# Patient Record
Sex: FEMALE | Race: WHITE | Employment: PART TIME | URBAN - METROPOLITAN AREA
[De-identification: names, ages, dates, MRNs, and addresses within clinical notes are randomized per-mention and may not be internally consistent; named-entity substitution may affect disease eponyms.]

---

## 2018-06-12 ENCOUNTER — HOSPITAL ENCOUNTER (EMERGENCY)
Age: 43
Discharge: HOME | End: 2018-06-12
Attending: EMERGENCY MEDICINE
Payer: COMMERCIAL

## 2018-06-12 VITALS
BODY MASS INDEX: 25.61 KG/M2 | TEMPERATURE: 98.1 F | WEIGHT: 150 LBS | HEIGHT: 64 IN | SYSTOLIC BLOOD PRESSURE: 114 MMHG | RESPIRATION RATE: 18 BRPM | HEART RATE: 67 BPM | DIASTOLIC BLOOD PRESSURE: 75 MMHG | OXYGEN SATURATION: 98 %

## 2018-06-12 DIAGNOSIS — I45.10 INCOMPLETE RIGHT BUNDLE BRANCH BLOCK (RBBB) DETERMINED BY ELECTROCARDIOGRAPHY: ICD-10-CM

## 2018-06-12 DIAGNOSIS — R10.13 ABDOMINAL PAIN, EPIGASTRIC: Primary | ICD-10-CM

## 2018-06-12 DIAGNOSIS — R11.0 NAUSEA WITHOUT VOMITING: ICD-10-CM

## 2018-06-12 DIAGNOSIS — R82.71 ASYMPTOMATIC BACTERIURIA: ICD-10-CM

## 2018-06-12 LAB
ALBUMIN SERPL-MCNC: 3.9 G/DL (ref 3.4–5)
ALBUMIN/GLOB SERPL: 1.2 {RATIO} (ref 0.8–1.7)
ALP SERPL-CCNC: 49 U/L (ref 45–117)
ALT SERPL-CCNC: 24 U/L (ref 13–56)
ANION GAP SERPL CALC-SCNC: 5 MMOL/L (ref 3–18)
APPEARANCE UR: CLEAR
AST SERPL-CCNC: 17 U/L (ref 15–37)
ATRIAL RATE: 56 BPM
BACTERIA URNS QL MICRO: ABNORMAL /HPF
BASOPHILS # BLD: 0 K/UL (ref 0–0.06)
BASOPHILS NFR BLD: 0 % (ref 0–2)
BILIRUB DIRECT SERPL-MCNC: 0.1 MG/DL (ref 0–0.2)
BILIRUB SERPL-MCNC: 0.3 MG/DL (ref 0.2–1)
BILIRUB UR QL: NEGATIVE
BUN SERPL-MCNC: 19 MG/DL (ref 7–18)
BUN/CREAT SERPL: 24 (ref 12–20)
CALCIUM SERPL-MCNC: 9.1 MG/DL (ref 8.5–10.1)
CALCULATED P AXIS, ECG09: 27 DEGREES
CALCULATED R AXIS, ECG10: 60 DEGREES
CALCULATED T AXIS, ECG11: 50 DEGREES
CHLORIDE SERPL-SCNC: 102 MMOL/L (ref 100–108)
CK MB CFR SERPL CALC: NORMAL % (ref 0–4)
CK MB SERPL-MCNC: <1 NG/ML (ref 5–25)
CK SERPL-CCNC: 58 U/L (ref 26–192)
CO2 SERPL-SCNC: 31 MMOL/L (ref 21–32)
COLOR UR: YELLOW
CREAT SERPL-MCNC: 0.8 MG/DL (ref 0.6–1.3)
DIAGNOSIS, 93000: NORMAL
DIFFERENTIAL METHOD BLD: ABNORMAL
EOSINOPHIL # BLD: 0.1 K/UL (ref 0–0.4)
EOSINOPHIL NFR BLD: 1 % (ref 0–5)
EPITH CASTS URNS QL MICRO: ABNORMAL /LPF (ref 0–5)
ERYTHROCYTE [DISTWIDTH] IN BLOOD BY AUTOMATED COUNT: 13.1 % (ref 11.6–14.5)
GLOBULIN SER CALC-MCNC: 3.2 G/DL (ref 2–4)
GLUCOSE SERPL-MCNC: 91 MG/DL (ref 74–99)
GLUCOSE UR STRIP.AUTO-MCNC: NEGATIVE MG/DL
HCG UR QL: NEGATIVE
HCT VFR BLD AUTO: 39.1 % (ref 35–45)
HGB BLD-MCNC: 12.9 G/DL (ref 12–16)
HGB UR QL STRIP: NEGATIVE
KETONES UR QL STRIP.AUTO: ABNORMAL MG/DL
LEUKOCYTE ESTERASE UR QL STRIP.AUTO: ABNORMAL
LIPASE SERPL-CCNC: 146 U/L (ref 73–393)
LYMPHOCYTES # BLD: 2.6 K/UL (ref 0.9–3.6)
LYMPHOCYTES NFR BLD: 31 % (ref 21–52)
MCH RBC QN AUTO: 30.8 PG (ref 24–34)
MCHC RBC AUTO-ENTMCNC: 33 G/DL (ref 31–37)
MCV RBC AUTO: 93.3 FL (ref 74–97)
MONOCYTES # BLD: 0.7 K/UL (ref 0.05–1.2)
MONOCYTES NFR BLD: 9 % (ref 3–10)
NEUTS SEG # BLD: 4.8 K/UL (ref 1.8–8)
NEUTS SEG NFR BLD: 59 % (ref 40–73)
NITRITE UR QL STRIP.AUTO: NEGATIVE
P-R INTERVAL, ECG05: 100 MS
PH UR STRIP: 5.5 [PH] (ref 5–8)
PLATELET # BLD AUTO: 229 K/UL (ref 135–420)
PMV BLD AUTO: 11.2 FL (ref 9.2–11.8)
POTASSIUM SERPL-SCNC: 4.2 MMOL/L (ref 3.5–5.5)
PROT SERPL-MCNC: 7.1 G/DL (ref 6.4–8.2)
PROT UR STRIP-MCNC: NEGATIVE MG/DL
Q-T INTERVAL, ECG07: 452 MS
QRS DURATION, ECG06: 98 MS
QTC CALCULATION (BEZET), ECG08: 436 MS
RBC # BLD AUTO: 4.19 M/UL (ref 4.2–5.3)
RBC #/AREA URNS HPF: ABNORMAL /HPF (ref 0–5)
SODIUM SERPL-SCNC: 138 MMOL/L (ref 136–145)
SP GR UR REFRACTOMETRY: 1.02 (ref 1–1.03)
TROPONIN I SERPL-MCNC: <0.02 NG/ML (ref 0–0.06)
UROBILINOGEN UR QL STRIP.AUTO: 1 EU/DL (ref 0.2–1)
VENTRICULAR RATE, ECG03: 56 BPM
WBC # BLD AUTO: 8.3 K/UL (ref 4.6–13.2)
WBC URNS QL MICRO: ABNORMAL /HPF (ref 0–4)

## 2018-06-12 PROCEDURE — 81001 URINALYSIS AUTO W/SCOPE: CPT | Performed by: EMERGENCY MEDICINE

## 2018-06-12 PROCEDURE — 93005 ELECTROCARDIOGRAM TRACING: CPT

## 2018-06-12 PROCEDURE — 83690 ASSAY OF LIPASE: CPT | Performed by: PHYSICIAN ASSISTANT

## 2018-06-12 PROCEDURE — 85025 COMPLETE CBC W/AUTO DIFF WBC: CPT | Performed by: EMERGENCY MEDICINE

## 2018-06-12 PROCEDURE — 80076 HEPATIC FUNCTION PANEL: CPT | Performed by: PHYSICIAN ASSISTANT

## 2018-06-12 PROCEDURE — 74011250637 HC RX REV CODE- 250/637

## 2018-06-12 PROCEDURE — 82550 ASSAY OF CK (CPK): CPT | Performed by: EMERGENCY MEDICINE

## 2018-06-12 PROCEDURE — 96375 TX/PRO/DX INJ NEW DRUG ADDON: CPT

## 2018-06-12 PROCEDURE — 81025 URINE PREGNANCY TEST: CPT | Performed by: EMERGENCY MEDICINE

## 2018-06-12 PROCEDURE — 80048 BASIC METABOLIC PNL TOTAL CA: CPT | Performed by: EMERGENCY MEDICINE

## 2018-06-12 PROCEDURE — 96374 THER/PROPH/DIAG INJ IV PUSH: CPT

## 2018-06-12 PROCEDURE — 74011250636 HC RX REV CODE- 250/636

## 2018-06-12 PROCEDURE — 74011250636 HC RX REV CODE- 250/636: Performed by: PHYSICIAN ASSISTANT

## 2018-06-12 PROCEDURE — 96361 HYDRATE IV INFUSION ADD-ON: CPT

## 2018-06-12 PROCEDURE — 99284 EMERGENCY DEPT VISIT MOD MDM: CPT

## 2018-06-12 RX ORDER — ONDANSETRON 4 MG/1
TABLET, ORALLY DISINTEGRATING ORAL
Qty: 10 TAB | Refills: 0 | Status: SHIPPED | OUTPATIENT
Start: 2018-06-12 | End: 2019-09-24 | Stop reason: ALTCHOICE

## 2018-06-12 RX ORDER — KETOROLAC TROMETHAMINE 30 MG/ML
INJECTION, SOLUTION INTRAMUSCULAR; INTRAVENOUS
Status: DISCONTINUED
Start: 2018-06-12 | End: 2018-06-12 | Stop reason: HOSPADM

## 2018-06-12 RX ORDER — FAMOTIDINE 20 MG/1
20 TABLET, FILM COATED ORAL 2 TIMES DAILY
Qty: 10 TAB | Refills: 0 | Status: SHIPPED | OUTPATIENT
Start: 2018-06-12 | End: 2018-06-17

## 2018-06-12 RX ORDER — ONDANSETRON 2 MG/ML
4 INJECTION INTRAMUSCULAR; INTRAVENOUS
Status: COMPLETED | OUTPATIENT
Start: 2018-06-12 | End: 2018-06-12

## 2018-06-12 RX ORDER — ONDANSETRON 2 MG/ML
INJECTION INTRAMUSCULAR; INTRAVENOUS
Status: COMPLETED
Start: 2018-06-12 | End: 2018-06-12

## 2018-06-12 RX ORDER — KETOROLAC TROMETHAMINE 30 MG/ML
15 INJECTION, SOLUTION INTRAMUSCULAR; INTRAVENOUS
Status: COMPLETED | OUTPATIENT
Start: 2018-06-12 | End: 2018-06-12

## 2018-06-12 RX ADMIN — SODIUM CHLORIDE 1000 ML: 900 INJECTION, SOLUTION INTRAVENOUS at 17:52

## 2018-06-12 RX ADMIN — ALUMINUM HYDROXIDE AND MAGNESIUM HYDROXIDE 30 ML: 200; 200 SUSPENSION ORAL at 17:53

## 2018-06-12 RX ADMIN — ONDANSETRON 4 MG: 2 INJECTION INTRAMUSCULAR; INTRAVENOUS at 17:53

## 2018-06-12 RX ADMIN — KETOROLAC TROMETHAMINE 15 MG: 30 INJECTION INTRAMUSCULAR; INTRAVENOUS at 17:57

## 2018-06-12 NOTE — ED NOTES
Hollie Ballesteros is a 39 y.o. female that was discharged in stable. Pt was accompanied by spouse. Pt is not driving. The patients diagnosis, condition and treatment were explained to  patient and aftercare instructions were given. The patient verbalized understanding. Patient armband removed and shredded.

## 2018-06-12 NOTE — ED TRIAGE NOTES
Pt reports several days of progressively worsening intermittent epigastric upper abdominal pain with nausea, fatigue, hot flashes, body aches, and stomach upset.

## 2018-06-12 NOTE — DISCHARGE INSTRUCTIONS

## 2018-06-12 NOTE — ED PROVIDER NOTES
EMERGENCY DEPARTMENT HISTORY AND PHYSICAL EXAM    Date: 6/12/2018  Patient Name: Manuel Nelson    History of Presenting Illness     Chief Complaint   Patient presents with    Epigastric Pain    Nausea    Fatigue         History Provided By: Patient    Chief Complaint: Abdominal pain  Duration: 2 Days  Timing:  Constant and Waxing and Waning  Location: Epigastrium   Quality: Dull  Severity: 7 out of 10  Modifying Factors: None identified. Associated Symptoms: hot flashes with pain, nausea. Additional History (Context):     Manuel Nelson is a 39 y.o. female with no significant PMHX who presents to the emergency department C/O epigastric pain that waxes and wanes and is dull in nature. Associated sxs include hot flashes and nausea when the pain peaks. 3 episodes of loose stool yesterday, none today. Decreased PO intake today. Pt denies an association of pain with PO intake, vomiting, fevers, urinary sx, vaginal bleeding, and any other sxs or complaints. Pt reports she is under increased stress lately and wonders if this could be contributing. Last week felt achy, had productive cough and chest pain associated with cough. She states those symptoms have all improved. PCP: Silvia George MD    Current Facility-Administered Medications   Medication Dose Route Frequency Provider Last Rate Last Dose    sodium chloride 0.9 % bolus infusion 1,000 mL  1,000 mL IntraVENous ONCE Bhavna Eugene PA-C 1,000 mL/hr at 06/12/18 1752 1,000 mL at 06/12/18 1752    ketorolac (TORADOL) 30 mg/mL (1 mL) injection              Current Outpatient Prescriptions   Medication Sig Dispense Refill    ondansetron (ZOFRAN ODT) 4 mg disintegrating tablet Take 1-2 tablets every 6-8 hours as needed for nausea and vomiting. 10 Tab 0    famotidine (PEPCID) 20 mg tablet Take 1 Tab by mouth two (2) times a day for 5 days. 10 Tab 0       Past History     Past Medical History:  History reviewed.  No pertinent past medical history. Past Surgical History:  History reviewed. No pertinent surgical history. Family History:  Family History   Problem Relation Age of Onset    Cancer Father        Social History:  Social History   Substance Use Topics    Smoking status: None    Smokeless tobacco: None    Alcohol use None       Allergies:  No Known Allergies      Review of Systems   Review of Systems   Constitutional: Positive for diaphoresis. Negative for fever. Respiratory: Positive for cough (resolved). Cardiovascular: Positive for chest pain (resolved). Gastrointestinal: Positive for abdominal pain and nausea. Negative for constipation and vomiting. Genitourinary: Negative for dysuria and vaginal bleeding. All other systems reviewed and are negative. Physical Exam     Vitals:    06/12/18 1612   BP: 114/75   Pulse: 67   Resp: 18   Temp: 98.1 °F (36.7 °C)   SpO2: 98%   Weight: 68 kg (150 lb)   Height: 5' 4\" (1.626 m)     Physical Exam   Constitutional: She appears well-developed and well-nourished. No distress. HENT:   Head: Normocephalic and atraumatic. Right Ear: External ear normal.   Left Ear: External ear normal.   Nose: Nose normal.   Eyes: Conjunctivae are normal.   Neck: Normal range of motion. Cardiovascular: Normal rate, regular rhythm and normal heart sounds. Pulmonary/Chest: Effort normal and breath sounds normal. No respiratory distress. She has no wheezes. Abdominal: Soft. Bowel sounds are normal. She exhibits no distension. There is tenderness. There is no rebound and no guarding. Reproducible tenderness to the umbilicus and epigastrium. No RUQ or RLQ tenderness to palpation. No rebound or guarding. Neurological: She is alert. Skin: Skin is warm and dry. She is not diaphoretic. Psychiatric: She has a normal mood and affect. Vitals reviewed.         Diagnostic Study Results     Labs -     Recent Results (from the past 12 hour(s))   URINALYSIS W/ RFLX MICROSCOPIC    Collection Time: 06/12/18  3:55 PM   Result Value Ref Range    Color YELLOW      Appearance CLEAR      Specific gravity 1.022 1.005 - 1.030      pH (UA) 5.5 5.0 - 8.0      Protein NEGATIVE  NEG mg/dL    Glucose NEGATIVE  NEG mg/dL    Ketone TRACE (A) NEG mg/dL    Bilirubin NEGATIVE  NEG      Blood NEGATIVE  NEG      Urobilinogen 1.0 0.2 - 1.0 EU/dL    Nitrites NEGATIVE  NEG      Leukocyte Esterase MODERATE (A) NEG     HCG URINE, QL    Collection Time: 06/12/18  3:55 PM   Result Value Ref Range    HCG urine, QL NEGATIVE  NEG     URINE MICROSCOPIC ONLY    Collection Time: 06/12/18  3:55 PM   Result Value Ref Range    WBC 4 to 10 0 - 4 /hpf    RBC NONE 0 - 5 /hpf    Epithelial cells 1+ 0 - 5 /lpf    Bacteria 1+ (A) NEG /hpf   EKG, 12 LEAD, INITIAL    Collection Time: 06/12/18  4:22 PM   Result Value Ref Range    Ventricular Rate 56 BPM    Atrial Rate 56 BPM    P-R Interval 100 ms    QRS Duration 98 ms    Q-T Interval 452 ms    QTC Calculation (Bezet) 436 ms    Calculated P Axis 27 degrees    Calculated R Axis 60 degrees    Calculated T Axis 50 degrees    Diagnosis       Sinus bradycardia with short AR  Incomplete right bundle branch block  Nonspecific T wave abnormality  Abnormal ECG  No previous ECGs available  Confirmed by Abhijit Kruse MD, Jack Brower ([id number]) on 6/12/2018 4:56:02 PM     CBC WITH AUTOMATED DIFF    Collection Time: 06/12/18  4:50 PM   Result Value Ref Range    WBC 8.3 4.6 - 13.2 K/uL    RBC 4.19 (L) 4.20 - 5.30 M/uL    HGB 12.9 12.0 - 16.0 g/dL    HCT 39.1 35.0 - 45.0 %    MCV 93.3 74.0 - 97.0 FL    MCH 30.8 24.0 - 34.0 PG    MCHC 33.0 31.0 - 37.0 g/dL    RDW 13.1 11.6 - 14.5 %    PLATELET 926 506 - 753 K/uL    MPV 11.2 9.2 - 11.8 FL    NEUTROPHILS 59 40 - 73 %    LYMPHOCYTES 31 21 - 52 %    MONOCYTES 9 3 - 10 %    EOSINOPHILS 1 0 - 5 %    BASOPHILS 0 0 - 2 %    ABS. NEUTROPHILS 4.8 1.8 - 8.0 K/UL    ABS. LYMPHOCYTES 2.6 0.9 - 3.6 K/UL    ABS. MONOCYTES 0.7 0.05 - 1.2 K/UL    ABS. EOSINOPHILS 0.1 0.0 - 0.4 K/UL    ABS.  BASOPHILS 0.0 0.0 - 0.06 K/UL    DF AUTOMATED     METABOLIC PANEL, BASIC    Collection Time: 06/12/18  4:50 PM   Result Value Ref Range    Sodium 138 136 - 145 mmol/L    Potassium 4.2 3.5 - 5.5 mmol/L    Chloride 102 100 - 108 mmol/L    CO2 31 21 - 32 mmol/L    Anion gap 5 3.0 - 18 mmol/L    Glucose 91 74 - 99 mg/dL    BUN 19 (H) 7.0 - 18 MG/DL    Creatinine 0.80 0.6 - 1.3 MG/DL    BUN/Creatinine ratio 24 (H) 12 - 20      GFR est AA >60 >60 ml/min/1.73m2    GFR est non-AA >60 >60 ml/min/1.73m2    Calcium 9.1 8.5 - 10.1 MG/DL   CARDIAC PANEL,(CK, CKMB & TROPONIN)    Collection Time: 06/12/18  4:50 PM   Result Value Ref Range    CK 58 26 - 192 U/L    CK - MB <1.0 <3.6 ng/ml    CK-MB Index  0.0 - 4.0 %     CALCULATION NOT PERFORMED WHEN RESULT IS BELOW LINEAR LIMIT    Troponin-I, Qt. <0.02 0.00 - 0.06 NG/ML   HEPATIC FUNCTION PANEL    Collection Time: 06/12/18  4:50 PM   Result Value Ref Range    Protein, total 7.1 6.4 - 8.2 g/dL    Albumin 3.9 3.4 - 5.0 g/dL    Globulin 3.2 2.0 - 4.0 g/dL    A-G Ratio 1.2 0.8 - 1.7      Bilirubin, total 0.3 0.2 - 1.0 MG/DL    Bilirubin, direct 0.1 0.0 - 0.2 MG/DL    Alk.  phosphatase 49 45 - 117 U/L    AST (SGOT) 17 15 - 37 U/L    ALT (SGPT) 24 13 - 56 U/L   LIPASE    Collection Time: 06/12/18  4:50 PM   Result Value Ref Range    Lipase 146 73 - 393 U/L       Radiologic Studies -   No orders to display     CT Results  (Last 48 hours)    None        CXR Results  (Last 48 hours)    None          Medications given in the ED-  Medications   sodium chloride 0.9 % bolus infusion 1,000 mL (1,000 mL IntraVENous New Bag 6/12/18 3972)   ketorolac (TORADOL) 30 mg/mL (1 mL) injection (not administered)   ondansetron (ZOFRAN) injection 4 mg (4 mg IntraVENous Given 6/12/18 1753)   aluminum-magnesium hydroxide (MAALOX) oral suspension 30 mL (30 mL Oral Given 6/12/18 1753)   ketorolac (TORADOL) injection 15 mg (15 mg IntraVENous Given 6/12/18 1757)         Medical Decision Making   I am the first provider for this patient. I reviewed the vital signs, available nursing notes, past medical history, past surgical history, family history and social history. Vital Signs-Reviewed the patient's vital signs. Pulse Oximetry Analysis - 98% on RA       EKG interpretation: (Preliminary)  5:25 PM   Sinus bradycardia at 56, incomplete RBB. EKG read by Dr. Tay Yan. Records Reviewed: Nursing Notes    Provider Notes (Medical Decision Making): Well appearing female with 2 days of epigastric pain and nausea. S/sx most c/w dyspepsia v. Gastritis. Labs are reassuring, exam not c/w acute or surgical abdomen. Low suspicion for ACS, 1 cardiac panel ordered and negative. Based on today's assessment and examination, I feel patient is stable for discharge and outpatient follow up for further evaluation. Procedures:  Procedures    ED Course:   Initial assessment performed. The patients presenting problems have been discussed, and they are in agreement with the care plan formulated and outlined with them. I have encouraged them to ask questions as they arise throughout their visit. 6:00 PM Labs reviewed; no leukocytosis or bands, H/H WNL, CMP and lipase unremarkable, cardiac panel negative. UA with moderate leuks but patient is asymptomatic. EKG with sinus bradycardia at 56 and incomplete RBB, no priors for comparison. 6:17 PM Pt reassessed. Resting comfortably in the stretcher with  at the bedside. States she feels \"okay\" currently. Pt updated on all lab and EKG findings. Diagnosis and Disposition       DISCHARGE NOTE:  6:19 PM  Laisha Pitt's  results have been reviewed with her. She has been counseled regarding her diagnosis, treatment, and plan. She verbally conveys understanding and agreement of the signs, symptoms, diagnosis, treatment and prognosis and additionally agrees to follow up as discussed. She also agrees with the care-plan and conveys that all of her questions have been answered.   I have also provided discharge instructions for her that include: educational information regarding their diagnosis and treatment, and list of reasons why they would want to return to the ED prior to their follow-up appointment, should her condition change. She has been provided with education for proper emergency department utilization. CLINICAL IMPRESSION:    1. Abdominal pain, epigastric    2. Nausea without vomiting    3. Asymptomatic bacteriuria    4. Incomplete right bundle branch block (RBBB) determined by electrocardiography        PLAN:  1. D/C Home  2. Current Discharge Medication List      START taking these medications    Details   ondansetron (ZOFRAN ODT) 4 mg disintegrating tablet Take 1-2 tablets every 6-8 hours as needed for nausea and vomiting. Qty: 10 Tab, Refills: 0      famotidine (PEPCID) 20 mg tablet Take 1 Tab by mouth two (2) times a day for 5 days. Qty: 10 Tab, Refills: 0           3.    Follow-up Information     Follow up With Details Comments Contact Info    Kim Swartz MD Schedule an appointment as soon as possible for a visit for further evaluation of stomach pain and EKG finding [address] [place] [address]  [phone]      Maria Fernanda Camejo MD Schedule an appointment as soon as possible for a visit  [address] [place] [address] Drive  [address]  [address] [address]      Joan Wynne MD Schedule an appointment as soon as possible for a visit for further evaluation of EKG findings [facility]  [facility]  [place] [address]  [phone]      [address] [facility] EMERGENCY DEPT  As needed, If symptoms worsen [address] [facility]  [phone]

## 2018-10-02 ENCOUNTER — OFFICE VISIT (OUTPATIENT)
Dept: INTERNAL MEDICINE CLINIC | Age: 43
End: 2018-10-02

## 2018-10-02 ENCOUNTER — HOSPITAL ENCOUNTER (OUTPATIENT)
Dept: LAB | Age: 43
Discharge: HOME | End: 2018-10-02
Payer: COMMERCIAL

## 2018-10-02 ENCOUNTER — HOSPITAL ENCOUNTER (OUTPATIENT)
Dept: GENERAL RADIOLOGY | Age: 43
Discharge: HOME | End: 2018-10-02
Payer: COMMERCIAL

## 2018-10-02 VITALS
HEIGHT: 64 IN | TEMPERATURE: 98.4 F | HEART RATE: 65 BPM | WEIGHT: 153.8 LBS | SYSTOLIC BLOOD PRESSURE: 110 MMHG | DIASTOLIC BLOOD PRESSURE: 70 MMHG | BODY MASS INDEX: 26.26 KG/M2 | RESPIRATION RATE: 14 BRPM | OXYGEN SATURATION: 98 %

## 2018-10-02 DIAGNOSIS — M54.42 LOW BACK PAIN DUE TO BILATERAL SCIATICA: ICD-10-CM

## 2018-10-02 DIAGNOSIS — Z76.89 ESTABLISHING CARE WITH NEW DOCTOR, ENCOUNTER FOR: Primary | ICD-10-CM

## 2018-10-02 DIAGNOSIS — R10.819 SUPRAPUBIC TENDERNESS: ICD-10-CM

## 2018-10-02 DIAGNOSIS — Z00.00 ROUTINE PHYSICAL EXAMINATION: ICD-10-CM

## 2018-10-02 DIAGNOSIS — M54.41 LOW BACK PAIN DUE TO BILATERAL SCIATICA: ICD-10-CM

## 2018-10-02 DIAGNOSIS — E66.3 OVERWEIGHT (BMI 25.0-29.9): ICD-10-CM

## 2018-10-02 DIAGNOSIS — Z12.39 SCREENING FOR BREAST CANCER: ICD-10-CM

## 2018-10-02 DIAGNOSIS — Z23 ENCOUNTER FOR IMMUNIZATION: ICD-10-CM

## 2018-10-02 DIAGNOSIS — R10.819 ABDOMINAL TENDERNESS WITHOUT REBOUND TENDERNESS, UNSPECIFIED LOCATION: ICD-10-CM

## 2018-10-02 LAB
ALBUMIN SERPL-MCNC: 4.2 G/DL (ref 3.4–5)
ALBUMIN/GLOB SERPL: 1.2 {RATIO} (ref 0.8–1.7)
ALP SERPL-CCNC: 37 U/L (ref 45–117)
ALT SERPL-CCNC: 20 U/L (ref 13–56)
ANION GAP SERPL CALC-SCNC: 8 MMOL/L (ref 3–18)
APPEARANCE UR: CLEAR
AST SERPL-CCNC: 13 U/L (ref 15–37)
BASOPHILS # BLD: 0 K/UL (ref 0–0.1)
BASOPHILS NFR BLD: 0 % (ref 0–2)
BILIRUB SERPL-MCNC: 0.5 MG/DL (ref 0.2–1)
BILIRUB UR QL: NEGATIVE
BUN SERPL-MCNC: 17 MG/DL (ref 7–18)
BUN/CREAT SERPL: 22 (ref 12–20)
CALCIUM SERPL-MCNC: 9 MG/DL (ref 8.5–10.1)
CHLORIDE SERPL-SCNC: 105 MMOL/L (ref 100–108)
CHOLEST SERPL-MCNC: 162 MG/DL
CO2 SERPL-SCNC: 26 MMOL/L (ref 21–32)
COLOR UR: YELLOW
CREAT SERPL-MCNC: 0.79 MG/DL (ref 0.6–1.3)
DIFFERENTIAL METHOD BLD: ABNORMAL
EOSINOPHIL # BLD: 0.1 K/UL (ref 0–0.4)
EOSINOPHIL NFR BLD: 1 % (ref 0–5)
ERYTHROCYTE [DISTWIDTH] IN BLOOD BY AUTOMATED COUNT: 13.2 % (ref 11.6–14.5)
EST. AVERAGE GLUCOSE BLD GHB EST-MCNC: 103 MG/DL
GLOBULIN SER CALC-MCNC: 3.4 G/DL (ref 2–4)
GLUCOSE SERPL-MCNC: 79 MG/DL (ref 74–99)
GLUCOSE UR STRIP.AUTO-MCNC: NEGATIVE MG/DL
HBA1C MFR BLD: 5.2 % (ref 4.2–5.6)
HCT VFR BLD AUTO: 42.5 % (ref 35–45)
HDLC SERPL-MCNC: 72 MG/DL (ref 40–60)
HDLC SERPL: 2.3 {RATIO} (ref 0–5)
HGB BLD-MCNC: 13.7 G/DL (ref 12–16)
HGB UR QL STRIP: NEGATIVE
KETONES UR QL STRIP.AUTO: NEGATIVE MG/DL
LDLC SERPL CALC-MCNC: 78.6 MG/DL (ref 0–100)
LEUKOCYTE ESTERASE UR QL STRIP.AUTO: NEGATIVE
LIPID PROFILE,FLP: ABNORMAL
LYMPHOCYTES # BLD: 2.7 K/UL (ref 0.9–3.6)
LYMPHOCYTES NFR BLD: 31 % (ref 21–52)
MCH RBC QN AUTO: 30.9 PG (ref 24–34)
MCHC RBC AUTO-ENTMCNC: 32.2 G/DL (ref 31–37)
MCV RBC AUTO: 95.7 FL (ref 74–97)
MONOCYTES # BLD: 0.5 K/UL (ref 0.05–1.2)
MONOCYTES NFR BLD: 6 % (ref 3–10)
NEUTS SEG # BLD: 5.3 K/UL (ref 1.8–8)
NEUTS SEG NFR BLD: 62 % (ref 40–73)
NITRITE UR QL STRIP.AUTO: NEGATIVE
PH UR STRIP: 5.5 [PH] (ref 5–8)
PLATELET # BLD AUTO: 255 K/UL (ref 135–420)
PMV BLD AUTO: 12.6 FL (ref 9.2–11.8)
POTASSIUM SERPL-SCNC: 4.5 MMOL/L (ref 3.5–5.5)
PROT SERPL-MCNC: 7.6 G/DL (ref 6.4–8.2)
PROT UR STRIP-MCNC: NEGATIVE MG/DL
RBC # BLD AUTO: 4.44 M/UL (ref 4.2–5.3)
SODIUM SERPL-SCNC: 139 MMOL/L (ref 136–145)
SP GR UR REFRACTOMETRY: 1.01 (ref 1–1.03)
T4 FREE SERPL-MCNC: 1 NG/DL (ref 0.7–1.5)
TRIGL SERPL-MCNC: 57 MG/DL (ref ?–150)
TSH SERPL DL<=0.05 MIU/L-ACNC: 2.1 UIU/ML (ref 0.36–3.74)
UROBILINOGEN UR QL STRIP.AUTO: 0.2 EU/DL (ref 0.2–1)
VLDLC SERPL CALC-MCNC: 11.4 MG/DL
WBC # BLD AUTO: 8.6 K/UL (ref 4.6–13.2)

## 2018-10-02 PROCEDURE — 36415 COLL VENOUS BLD VENIPUNCTURE: CPT | Performed by: NURSE PRACTITIONER

## 2018-10-02 PROCEDURE — 84443 ASSAY THYROID STIM HORMONE: CPT | Performed by: NURSE PRACTITIONER

## 2018-10-02 PROCEDURE — 81003 URINALYSIS AUTO W/O SCOPE: CPT | Performed by: NURSE PRACTITIONER

## 2018-10-02 PROCEDURE — 82306 VITAMIN D 25 HYDROXY: CPT | Performed by: NURSE PRACTITIONER

## 2018-10-02 PROCEDURE — 72100 X-RAY EXAM L-S SPINE 2/3 VWS: CPT

## 2018-10-02 PROCEDURE — 80061 LIPID PANEL: CPT | Performed by: NURSE PRACTITIONER

## 2018-10-02 PROCEDURE — 85025 COMPLETE CBC W/AUTO DIFF WBC: CPT | Performed by: NURSE PRACTITIONER

## 2018-10-02 PROCEDURE — 80053 COMPREHEN METABOLIC PANEL: CPT | Performed by: NURSE PRACTITIONER

## 2018-10-02 PROCEDURE — 83036 HEMOGLOBIN GLYCOSYLATED A1C: CPT | Performed by: NURSE PRACTITIONER

## 2018-10-02 PROCEDURE — 84439 ASSAY OF FREE THYROXINE: CPT | Performed by: NURSE PRACTITIONER

## 2018-10-02 RX ORDER — CALCIUM CARBONATE/VITAMIN D3 500 MG-10
TABLET ORAL 2 TIMES DAILY
COMMUNITY

## 2018-10-02 NOTE — MR AVS SNAPSHOT
Farnaz Braun 
 
 
 [address], [address] [facility] 
[phone] Patient: Ela Vazquez MRN: [id number] TER:[birth year] Visit Information Date & Time Provider Department Dept. Phone Encounter #  
 10/2/2018 11:00 AM Marina Agarwal NP [facility] [phone] [id number] Your Appointments 4/5/2019 10:00 AM  
Office Visit with Marina Agarwal NP [facility] ([address]) Appt Note: 6 month f/u  
 [address] [facility], [address] [place] [address] [place]  
  
   
 [address] [facility], [address] Upcoming Health Maintenance Date Due DTaP/Tdap/Td series (1 - Tdap) 3/3/1995 PAP AKA CERVICAL CYTOLOGY 3/3/1995 Influenza Age 5 to Adult 8/1/2018 Allergies as of 10/2/2018  Review Complete On: 10/2/2018 By: Jose Perkins Severity Noted Reaction Type Reactions Demerol [Meperidine]  10/02/2018    Other (comments) Lowers BP Current Immunizations  Never Reviewed No immunizations on file. Not reviewed this visit You Were Diagnosed With   
  
 Codes Comments Establishing care with new doctor, encounter for    -  Primary ICD-10-CM: Z76.89 
ICD-9-CM: V65.8 Routine physical examination     ICD-10-CM: Z00.00 ICD-9-CM: V70.0 Screening for breast cancer     ICD-10-CM: Z12.31 
ICD-9-CM: V76.10 Overweight (BMI 25.0-29. 9)     ICD-10-CM: U56.0 ICD-9-CM: 278.02 Low back pain due to bilateral sciatica     ICD-10-CM: M54.41, M54.42 
ICD-9-CM: 724.3, 724.2 chronic Abdominal tenderness without rebound tenderness, unspecified location     ICD-10-CM: R10.819 ICD-9-CM: 789.60 Suprapubic tenderness     ICD-10-CM: R10.819 ICD-9-CM: 789.69 Vitals BP Pulse Temp Resp Height(growth percentile) Weight(growth percentile)  110/70 (BP 1 Location: Left arm, BP Patient Position: Sitting) 65 98.4 °F (36.9 °C) (Oral) 14 5' 4\" (1.626 m) 153 lb 12.8 oz (69.8 kg) LMP SpO2 BMI OB Status Smoking Status 09/11/2018 (Exact Date) 98% 26.4 kg/m2 Having regular periods Never Smoker Vitals History BMI and BSA Data Body Mass Index Body Surface Area  
 26.4 kg/m 2 1.78 m 2 Your Updated Medication List  
  
   
This list is accurate as of 10/2/18 12:13 PM.  Always use your most recent med list.  
  
  
  
  
 Calcium-Cholecalciferol (D3) 500 mg(1,250mg) -400 unit Tab Take  by mouth. KRILL OIL PO Take  by mouth. ondansetron 4 mg disintegrating tablet Commonly known as:  ZOFRAN ODT Take 1-2 tablets every 6-8 hours as needed for nausea and vomiting. PROBIOTIC 4X 10-15 mg Tbec Generic drug:  B.infantis-B.ani-B.long-B.bifi Take  by mouth. We Performed the Following AMB POC GONADOTROPIN, CHORIONIC (HCG); QUALITATIVE [08233 CPT(R)] AMB POC URINALYSIS DIP STICK AUTO W/O MICRO [36271 CPT(R)] To-Do List   
 10/02/2018 Imaging:  [facility] BI SCREENING INCL CAD   
  
 10/02/2018 Imaging:  US ABD COMP   
  
 10/02/2018 Imaging:  XR SPINE LUMB 2 OR 3 V Introducing \[facility] [place] [facility]\"" & HEALTH SERVICES! Dear Gildardo Connors: Thank you for requesting a [employer] account. Our records indicate that you already have an active [employer] account. You can access your account anytime at [email or web address] Did you know that you can access your hospital and ER discharge instructions at any time in [employer]? You can also review all of your test results from your hospital stay or ER visit. Additional Information If you have questions, please visit the Frequently Asked Questions section of the [employer] website at [email or web address] Remember, [employer] is NOT to be used for urgent needs. For medical emergencies, dial 911. Now available from your iPhone and Android! Please provide this summary of care documentation to your next provider. Your primary care clinician is listed as Jessica John. If you have any questions after today's visit, please call [phone].

## 2018-10-02 NOTE — PROGRESS NOTES
eBlen Garcia is a 39 y.o.  female and presents with Chief Complaint Patient presents with [facility]  Back Pain Patient here for lower back pain. patient reports having pain in the past with siatic. Patient reports that the pain has now moved into the lower abdomin. Patient reports it feels like a crapping pain. x 1 week Subjective: HPI Mrs. Fox Grayson presents today to establish care. She is a part-time . She is a mother of 1, . She has no significant PMH but PSH with tubal ligation, x 2 c-sections and wisdom teeth removal. [facility] with heart disease. She is follow [facility] scheduled on 10/17/18. Last pap was last year, normal. About every 3rd month with painful menstruation with n/v, low back pain. Transvaginal US ordered, uterine lining thickened, biopsy completed and told looks good. Since then with \"debilating cramps\" again intermittently lasting x 2 hours. Has tried [facility] in the past for the menstrual pain, creating lighter periods, with less cramping, however with side effects of decreased libido etc... She reports mother with similar symptoms prior to having children. Last mammogram was 2016 reported as benign with recommendations for one year follow up. Records from [facility], Dr. Fouzia Hull, reviewed: reported Transvaginal US with thickening of the endometrial lining, small fibroid 1.2 cm-1 cm, no adnexal masses or free fluid, normal ovaries. Endometrial biopsy performed 11/2/2017, no hyperplasia or carcinoma. She is overweight. She has a gym membership 2-3 times a year. She has been doing Rudy, lifts weights, however reports knee pain so has been taking a break. Additional Concerns: She is with complaints of chronic intermittent low back pain with sciatica left worse than right. She is using an equivalent to icy hot with some relief.   She used to ride horses, weight lifts, poor ergonomics. She also reports with suprapubic tenderness described as a cramping pain x 1 week. She reports was lifting things about 1 week ago, noted soreness to the low back a couple days later, and her son tripped hitting her in the low abdomin over the suprapubic region. She reports with  scar tissue and thinks her abdominal pain is related to that. The pain has persisted. She has not used OTC therapy. ROS Review of Systems Constitutional: Negative. HENT: Negative. Eyes: Negative. Respiratory: Negative. Cardiovascular: Negative. Gastrointestinal:  
     Reports craving more foods Tenderness over the suprapubic region Genitourinary: Negative. Musculoskeletal: Negative. Skin: Negative. Neurological: Negative. Endo/Heme/Allergies: Negative. Psychiatric/Behavioral: Negative. Allergies Allergen Reactions  Demerol [Meperidine] Other (comments) Lowers BP Current Outpatient Prescriptions Medication Sig Dispense Refill  Calcium-Cholecalciferol, D3, 500 mg(1,250mg) -400 unit tab Take  by mouth.  KRILL OIL PO Take  by mouth.  B.infantis-B.ani-B.long-B.bifi (PROBIOTIC 4X) 10-15 mg TbEC Take  by mouth.  ondansetron (ZOFRAN ODT) 4 mg disintegrating tablet Take 1-2 tablets every 6-8 hours as needed for nausea and vomiting. 10 Tab 0 Social History Social History  Marital status:  Spouse name: N/A  
 Number of children: N/A  
 Years of education: N/A Occupational History  Not on file. Social History Main Topics  Smoking status: Never Smoker  Smokeless tobacco: Never Used  Alcohol use 1.8 oz/week 3 Glasses of wine per week Comment: occiassional   
 Drug use: No  
 Sexual activity: Yes  
  Partners: Male Birth control/ protection: None, Surgical  
   Comment: tubal ligation Other Topics Concern  Not on file Social History Narrative History reviewed. No pertinent past medical history. Past Surgical History:  
Procedure Laterality Date  HX  SECTION Patient reports 2 in  &   HX DILATION AND CURETTAGE Patient reports   HX GI    
 reported x 3 surgeries for anal fissure  HX TUBAL LIGATION    
   HX WISDOM TEETH EXTRACTION Family History Problem Relation Age of Onset  Cancer Father   
  prostate  Hypertension Father  Heart defect Father Heart blockage  Heart defect Mother  Glaucoma Mother  Elevated Lipids Mother  Arthritis-osteo Brother  Kidney Disease Maternal Grandmother  Cancer Maternal Grandfather Lung cancer  Heart Attack Paternal Grandmother  Heart Attack Paternal Grandfather  Pancreatic Cancer Paternal Grandfather Objective: 
Vitals:  
 10/02/18 1048 BP: 110/70 Pulse: 65 Resp: 14 Temp: 98.4 °F (36.9 °C) TempSrc: Oral  
SpO2: 98% Weight: 153 lb 12.8 oz (69.8 kg) Height: 5' 4\" (1.626 m) PainSc:   5 PainLoc: Back LMP: 2018 LABS Results for orders placed or performed during the hospital encounter of 18 URINALYSIS W/ RFLX MICROSCOPIC Result Value Ref Range Color YELLOW Appearance CLEAR Specific gravity 1.022 1.005 - 1.030    
 pH (UA) 5.5 5.0 - 8.0 Protein NEGATIVE  NEG mg/dL Glucose NEGATIVE  NEG mg/dL Ketone TRACE (A) NEG mg/dL Bilirubin NEGATIVE  NEG Blood NEGATIVE  NEG Urobilinogen 1.0 0.2 - 1.0 EU/dL Nitrites NEGATIVE  NEG Leukocyte Esterase MODERATE (A) NEG    
CBC WITH AUTOMATED DIFF Result Value Ref Range WBC 8.3 4.6 - 13.2 K/uL  
 RBC 4.19 (L) 4.20 - 5.30 M/uL  
 HGB 12.9 12.0 - 16.0 g/dL HCT 39.1 35.0 - 45.0 % MCV 93.3 74.0 - 97.0 FL  
 MCH 30.8 24.0 - 34.0 PG  
 MCHC 33.0 31.0 - 37.0 g/dL  
 RDW 13.1 11.6 - 14.5 % PLATELET 943 845 - 298 K/uL MPV 11.2 9.2 - 11.8 FL  
 NEUTROPHILS 59 40 - 73 % LYMPHOCYTES 31 21 - 52 % MONOCYTES 9 3 - 10 % EOSINOPHILS 1 0 - 5 % BASOPHILS 0 0 - 2 %  
 ABS. NEUTROPHILS 4.8 1.8 - 8.0 K/UL  
 ABS. LYMPHOCYTES 2.6 0.9 - 3.6 K/UL  
 ABS. MONOCYTES 0.7 0.05 - 1.2 K/UL  
 ABS. EOSINOPHILS 0.1 0.0 - 0.4 K/UL  
 ABS. BASOPHILS 0.0 0.0 - 0.06 K/UL  
 DF AUTOMATED METABOLIC PANEL, BASIC Result Value Ref Range Sodium 138 136 - 145 mmol/L Potassium 4.2 3.5 - 5.5 mmol/L Chloride 102 100 - 108 mmol/L  
 CO2 31 21 - 32 mmol/L Anion gap 5 3.0 - 18 mmol/L Glucose 91 74 - 99 mg/dL BUN 19 (H) 7.0 - 18 MG/DL Creatinine 0.80 0.6 - 1.3 MG/DL  
 BUN/Creatinine ratio 24 (H) 12 - 20 GFR est AA >60 >60 ml/min/1.73m2 GFR est non-AA >60 >60 ml/min/1.73m2 Calcium 9.1 8.5 - 10.1 MG/DL  
CARDIAC PANEL,(CK, CKMB & TROPONIN) Result Value Ref Range CK 58 26 - 192 U/L  
 CK - MB <1.0 <3.6 ng/ml CK-MB Index  0.0 - 4.0 % CALCULATION NOT PERFORMED WHEN RESULT IS BELOW LINEAR LIMIT Troponin-I, Qt. <0.02 0.00 - 0.06 NG/ML  
HCG URINE, QL Result Value Ref Range HCG urine, QL NEGATIVE  NEG    
HEPATIC FUNCTION PANEL Result Value Ref Range Protein, total 7.1 6.4 - 8.2 g/dL Albumin 3.9 3.4 - 5.0 g/dL Globulin 3.2 2.0 - 4.0 g/dL A-G Ratio 1.2 0.8 - 1.7 Bilirubin, total 0.3 0.2 - 1.0 MG/DL Bilirubin, direct 0.1 0.0 - 0.2 MG/DL Alk. phosphatase 49 45 - 117 U/L  
 AST (SGOT) 17 15 - 37 U/L  
 ALT (SGPT) 24 13 - 56 U/L  
LIPASE Result Value Ref Range Lipase 146 73 - 393 U/L  
URINE MICROSCOPIC ONLY Result Value Ref Range WBC 4 to 10 0 - 4 /hpf  
 RBC NONE 0 - 5 /hpf Epithelial cells 1+ 0 - 5 /lpf Bacteria 1+ (A) NEG /hpf  
EKG, 12 LEAD, INITIAL Result Value Ref Range Ventricular Rate 56 BPM  
 Atrial Rate 56 BPM  
 P-R Interval 100 ms QRS Duration 98 ms Q-T Interval 452 ms QTC Calculation (Bezet) 436 ms Calculated P Axis 27 degrees Calculated R Axis 60 degrees Calculated T Axis 50 degrees Diagnosis Sinus bradycardia with short ND Incomplete right bundle branch block Nonspecific T wave abnormality Abnormal ECG No previous ECGs available Confirmed by Elle Gupta MD, Sania Yarbrough ([id number]) on 6/12/2018 4:56:02 PM 
  
 
 
TESTS 
EKG 6/2018 Diagnosis   Final  
Sinus bradycardia with short ND Incomplete right bundle branch block Nonspecific T wave abnormality Abnormal ECG   
 
 
PE 
Physical Exam  
Constitutional: She is oriented to person, place, and time. She appears well-developed and well-nourished. No distress. HENT:  
Head: Normocephalic and atraumatic. Right Ear: External ear normal.  
Left Ear: External ear normal.  
Nose: Nose normal.  
Mouth/Throat: Oropharynx is clear and moist. No oropharyngeal exudate. OME bilat and postnasal drip noted with a cobblestone appearance to the posterior oropharynx Eyes: Conjunctivae and EOM are normal. Pupils are equal, round, and reactive to light. Right eye exhibits no discharge. Left eye exhibits no discharge. No scleral icterus. Neck: Normal range of motion. Neck supple. Cardiovascular: Normal rate, regular rhythm, normal heart sounds and intact distal pulses. No murmur heard. Pulmonary/Chest: Effort normal and breath sounds normal. No respiratory distress. She has no wheezes. She has no rales. She exhibits no tenderness. Abdominal: Soft. Bowel sounds are normal. She exhibits no distension and no mass. There is tenderness. There is no rebound and no guarding. Musculoskeletal: Normal range of motion. She exhibits no edema, tenderness or deformity. Lymphadenopathy:  
  She has no cervical adenopathy. Neurological: She is alert and oriented to person, place, and time. She has normal reflexes. No cranial nerve deficit. Coordination normal.  
Skin: Skin is warm and dry. No rash noted. She is not diaphoretic. No erythema. No pallor. Psychiatric: She has a normal mood and affect.  Her behavior is normal. Judgment and thought content normal.  
Vitals reviewed. Assessment/Plan: 1. Establish care- CPE without pap today. Labs ordered. 2. Chronic low back pain with sciatica left worse than right- usually resolved with conservative treatments. Xray low back today. Straight leg raise negative. With bony tenderness lumbar region. Used to ride horses, weight lifts. Most likely OA. 3. Suprapubic tenderness/low abdominal tenderness after trauma- US abdomen ordered. Will call with results. UA negative, HCQ negative. VSS, no constitutional or acute abdomen noted, no blood loss, bruising, or rashes, constipation or dysuria reported. 4. Overweight- She is on the intermittent fasting diet, however reports at a plateau. She does go to the gym 2-3 times a week, cardio, weight lifting. Lab review: orders written for new lab studies as appropriate; see orders Today's Visit:  
Diagnoses and all orders for this visit: 1. Establishing care with new doctor, encounter for 2. Routine physical examination 
-     CBC WITH AUTOMATED DIFF; Future -     METABOLIC PANEL, COMPREHENSIVE; Future 
-     HEMOGLOBIN A1C WITH EAG; Future -     LIPID PANEL; Future 
-     TSH 3RD GENERATION; Future -     T4, FREE; Future -     URINALYSIS W/ RFLX MICROSCOPIC; Future -     AMB POC GONADOTROPIN, CHORIONIC (HCG); QUALITATIVE 
-     AMB POC URINALYSIS DIP STICK AUTO W/O MICRO 
-     [facility] MAMMO BI SCREENING INCL CAD; Future -     VITAMIN D, 25 HYDROXY; Future 3. Screening for breast cancer -     [facility] MAMMO BI SCREENING INCL CAD; Future 4. Overweight (BMI 25.0-29.9) 5. Low back pain due to bilateral sciatica Comments: 
chronic Orders: 
-     XR SPINE LUMB 2 OR 3 V; Future 6. Abdominal tenderness without rebound tenderness, unspecified location 
-     US ABD COMP; Future 7. Suprapubic tenderness -     US ABD COMP; Future Health Maintenance: Influenza today. Mammogram ordered.  Pap scheduled for 10/17/18. Tdap-records requested. I have discussed the diagnosis with the patient and the intended plan as seen in the above orders. The patient has received an after-visit summary and questions were answered concerning future plans. I have discussed medication side effects and warnings with the patient as well. I have reviewed the plan of care with the patient, accepted their input and they are in agreement with the treatment goals. Follow-up Disposition: Not on File More than 1/2 of this 60 minute visit was spent in counseling and coordination of care, as described above. TAMMY Rocha Internist of [facility] [address] [place], [address] Phone: [phone] Fax: [phone]

## 2018-10-03 ENCOUNTER — TELEPHONE (OUTPATIENT)
Dept: INTERNAL MEDICINE CLINIC | Age: 43
End: 2018-10-03

## 2018-10-03 DIAGNOSIS — R93.7 ABNORMAL X-RAY OF LUMBAR SPINE: ICD-10-CM

## 2018-10-03 DIAGNOSIS — M53.3 SI (SACROILIAC) PAIN: Primary | ICD-10-CM

## 2018-10-03 LAB — 25(OH)D3 SERPL-MCNC: 33.6 NG/ML (ref 30–100)

## 2018-10-03 NOTE — TELEPHONE ENCOUNTER
The imaging is showing arthritic changes however there is an area of increased density at the sacroiliac joint so I want to do further imaging to look at the area more if ok. I will place the order for imaging, can go to [facility] when available, and if still with concern then next step is to see an orthopedic specialist. Vincenzo Gross looked great, just look a little dry so increase your water intake. Vitamin D is still pending results.

## 2018-10-03 NOTE — TELEPHONE ENCOUNTER
Spoke with patient, given all results below and she verbalized understanding. She will get additional imaging done soon. Also gave her schedulers number to get her US scheduled. Aware that we are still awaiting Vit D results and that we will likely give this result to her when we talk with her about her imaging results.

## 2018-10-04 ENCOUNTER — TELEPHONE (OUTPATIENT)
Dept: INTERNAL MEDICINE CLINIC | Age: 43
End: 2018-10-04

## 2018-10-04 NOTE — TELEPHONE ENCOUNTER
----- Message from Felicia Martinez NP sent at 10/4/2018  8:45 AM EDT -----  Vitamin D level is in good range on the low end of normal so continue supplementation with 800-1000 units daily of the Vitamin D and take calcium with it.

## 2018-10-08 ENCOUNTER — HOSPITAL ENCOUNTER (OUTPATIENT)
Dept: GENERAL RADIOLOGY | Age: 43
Discharge: HOME | End: 2018-10-08
Payer: COMMERCIAL

## 2018-10-08 DIAGNOSIS — M53.3 SI (SACROILIAC) PAIN: ICD-10-CM

## 2018-10-08 DIAGNOSIS — R93.7 ABNORMAL X-RAY OF LUMBAR SPINE: ICD-10-CM

## 2018-10-08 PROCEDURE — 72202 X-RAY EXAM SI JOINTS 3/> VWS: CPT

## 2018-10-10 NOTE — PROGRESS NOTES
Your imaging suggests chronic sacroiliitis which is an inflammation of the sacroiliac joint, therapy is anti-inflammatories and heat.

## 2018-10-11 ENCOUNTER — TELEPHONE (OUTPATIENT)
Dept: INTERNAL MEDICINE CLINIC | Age: 43
End: 2018-10-11

## 2018-10-11 DIAGNOSIS — R10.30 LOWER ABDOMINAL PAIN: Primary | ICD-10-CM

## 2018-10-11 NOTE — TELEPHONE ENCOUNTER
Please inform the patient that she needs to have a full bladder for the US scheduled at 330 tomorrow.

## 2018-10-11 NOTE — TELEPHONE ENCOUNTER
Pt aware of message below and verbalized understanding. No further questions or concerns from pt at this time. Pt will call scheduling to verify time.

## 2018-10-11 NOTE — TELEPHONE ENCOUNTER
----- Message from Kat Maradiaga NP sent at 10/10/2018  3:07 PM EDT -----  Your imaging suggests chronic sacroiliitis which is an inflammation of the sacroiliac joint, therapy is anti-inflammatories and heat.

## 2018-10-11 NOTE — TELEPHONE ENCOUNTER
Pt aware of message below and verbalized understanding. No further questions or concerns from pt at this time. Pt was advised to do full bladder ultrasound on tomorrow morning in replace of abdominal ultrsound.

## 2018-10-12 ENCOUNTER — HOSPITAL ENCOUNTER (OUTPATIENT)
Dept: ULTRASOUND IMAGING | Age: 43
Discharge: HOME | End: 2018-10-12
Attending: NURSE PRACTITIONER
Payer: COMMERCIAL

## 2018-10-12 ENCOUNTER — HOSPITAL ENCOUNTER (OUTPATIENT)
Dept: MAMMOGRAPHY | Age: 43
Discharge: HOME | End: 2018-10-12
Attending: NURSE PRACTITIONER
Payer: COMMERCIAL

## 2018-10-12 DIAGNOSIS — Z00.00 ROUTINE PHYSICAL EXAMINATION: ICD-10-CM

## 2018-10-12 DIAGNOSIS — R10.819 SUPRAPUBIC TENDERNESS: ICD-10-CM

## 2018-10-12 DIAGNOSIS — Z12.39 SCREENING FOR BREAST CANCER: ICD-10-CM

## 2018-10-12 DIAGNOSIS — R10.819 ABDOMINAL TENDERNESS WITHOUT REBOUND TENDERNESS, UNSPECIFIED LOCATION: ICD-10-CM

## 2018-10-12 PROCEDURE — 77067 SCR MAMMO BI INCL CAD: CPT

## 2018-10-12 PROCEDURE — 76856 US EXAM PELVIC COMPLETE: CPT

## 2018-10-12 NOTE — PROGRESS NOTES
The US of the pelvis is reporting multiple uterine fibroids, please be seen by Gynecology for further assessment and plan of care.

## 2018-10-16 ENCOUNTER — TELEPHONE (OUTPATIENT)
Dept: INTERNAL MEDICINE CLINIC | Age: 43
End: 2018-10-16

## 2018-10-16 NOTE — TELEPHONE ENCOUNTER
Called and spoke to patient about the message below. Patient stated that she has an appointment with [facility] tomorrow and she is going to have them request the Ultrasound results.

## 2018-10-16 NOTE — TELEPHONE ENCOUNTER
----- Message from Lindsay Roberts NP sent at 10/12/2018  5:02 PM EDT -----  The US of the pelvis is reporting multiple uterine fibroids, please be seen by Gynecology for further assessment and plan of care.

## 2019-04-05 ENCOUNTER — OFFICE VISIT (OUTPATIENT)
Dept: INTERNAL MEDICINE CLINIC | Age: 44
End: 2019-04-05

## 2019-04-05 VITALS
OXYGEN SATURATION: 97 % | SYSTOLIC BLOOD PRESSURE: 117 MMHG | HEIGHT: 64 IN | RESPIRATION RATE: 16 BRPM | TEMPERATURE: 98.3 F | HEART RATE: 61 BPM | DIASTOLIC BLOOD PRESSURE: 78 MMHG | WEIGHT: 161 LBS | BODY MASS INDEX: 27.49 KG/M2

## 2019-04-05 DIAGNOSIS — Z00.00 ROUTINE PHYSICAL EXAMINATION: ICD-10-CM

## 2019-04-05 DIAGNOSIS — E66.3 OVERWEIGHT (BMI 25.0-29.9): Primary | ICD-10-CM

## 2019-04-05 NOTE — PROGRESS NOTES
Oscar Harp presents today for   Chief Complaint   Patient presents with    Knee Pain     Rm # 10 left knee pain x 6 months     Back Pain     Chronic lower back pain.  Weight Gain               Depression Screening:  3 most recent PHQ Screens 4/5/2019   Little interest or pleasure in doing things Not at all   Feeling down, depressed, irritable, or hopeless Not at all   Total Score PHQ 2 0       Learning Assessment:  Learning Assessment 4/5/2019   PRIMARY LEARNER Patient   HIGHEST LEVEL OF EDUCATION - PRIMARY LEARNER  2 YEARS OF COLLEGE   BARRIERS PRIMARY LEARNER NONE   CO-LEARNER CAREGIVER No   PRIMARY LANGUAGE ENGLISH   LEARNER PREFERENCE PRIMARY DEMONSTRATION   ANSWERED BY patient   RELATIONSHIP SELF       Abuse Screening:  Abuse Screening Questionnaire 4/5/2019   Do you ever feel afraid of your partner? N   Are you in a relationship with someone who physically or mentally threatens you? N   Is it safe for you to go home? Y           Coordination of Care:  1. Have you been to the ER, urgent care clinic since your last visit? Hospitalized since your last visit? yes    2. Have you seen or consulted any other health care providers outside of the [address] [place] [address] since your last visit? Include any pap smears or colon screening.  yes

## 2019-04-05 NOTE — PROGRESS NOTES
Oscar Harp is a 38 y.o.  female and presents with    Chief Complaint   Patient presents with    Knee Pain     Rm # 10 left knee pain x 6 months     Back Pain     Chronic lower back pain.  Weight Gain       Subjective:  HPI  Mrs. Cady Irizarry presents today for with acute on chronic knee pain left worse than right. As well as back pain and with weight gain. She reports steadily gaining weight. She is working out, which has slowed down due to knee pain. Had hysterectomy 2018. IMPRESSION:     No acute osseous abnormality. Subarticular sclerosis in the left SI joint suggesting chronic sacroiliitis. She is a part-time . She is a mother of 1, . She has no significant PMH but PSH with tubal ligation, partial hysterectomy 2018 with ovaries intact, x 2 c-sections and wisdom teeth removal. [address] with heart disease. She is follow by [facility] for [facility]necology scheduled on 10/17/18. Last pap was last year, normal. About every 3rd month with painful menstruation with n/v, low back pain. Transvaginal US ordered, uterine lining thickened, biopsy completed and told looks good. Since then with \"debilating cramps\" again intermittently lasting x 2 hours. Has tried [facility] in the past for the menstrual pain, creating lighter periods, with less cramping, however with side effects of decreased libido etc... She reports mother with similar symptoms prior to having children. Last mammogram was 2016 reported as benign with recommendations for one year follow up. Records from [facility], Dr. Ayush Danielson, reviewed: reported Transvaginal US with thickening of the endometrial lining, small fibroid 1.2 cm-1 cm, no adnexal masses or free fluid, normal ovaries. Endometrial biopsy performed 11/2/2017, no hyperplasia or carcinoma. Hysterectomy, ovaries intact 2018. She is overweight. She has a gym membership 2-3 times a year.  She has been doing Rudy, lifts weights, however reports knee pain so has been taking a break. Additional Concerns: none    ROS   Review of Systems   Musculoskeletal: Positive for back pain and joint pain. All other systems reviewed and are negative. Allergies   Allergen Reactions    Demerol [Meperidine] Other (comments)     Lowers BP       Current Outpatient Medications   Medication Sig Dispense Refill    Calcium-Cholecalciferol, D3, 500 mg(1,250mg) -400 unit tab Take  by mouth.  KRILL OIL PO Take  by mouth.  B.infantis-B.ani-B.long-B.bifi (PROBIOTIC 4X) 10-15 mg TbEC Take  by mouth.  ondansetron (ZOFRAN ODT) 4 mg disintegrating tablet Take 1-2 tablets every 6-8 hours as needed for nausea and vomiting. 10 Tab 0       Social History     Socioeconomic History    Marital status:      Spouse name: Not on file    Number of children: Not on file    Years of education: Not on file    Highest education level: Not on file   Occupational History    Not on file   Social Needs    Financial resource strain: Not on file    Food insecurity:     Worry: Not on file     Inability: Not on file    Transportation needs:     Medical: Not on file     Non-medical: Not on file   Tobacco Use    Smoking status: Never Smoker    Smokeless tobacco: Never Used   Substance and Sexual Activity    Alcohol use:  Yes     Alcohol/week: 1.8 oz     Types: 3 Glasses of wine per week     Comment: occiassional     Drug use: No    Sexual activity: Yes     Partners: Male     Birth control/protection: None, Surgical     Comment: tubal ligation   Lifestyle    Physical activity:     Days per week: Not on file     Minutes per session: Not on file    Stress: Not on file   Relationships    Social connections:     Talks on phone: Not on file     Gets together: Not on file     Attends Voodoo service: Not on file     Active member of club or organization: Not on file     Attends meetings of clubs or organizations: Not on file     Relationship status: Not on file  Intimate partner violence:     Fear of current or ex partner: Not on file     Emotionally abused: Not on file     Physically abused: Not on file     Forced sexual activity: Not on file   Other Topics Concern    Not on file   Social History Narrative    Not on file       No past medical history on file. Past Surgical History:   Procedure Laterality Date    HX  SECTION      Patient reports 2 in  &     HX [address]      Patient reports 2006    HX GI      reported x 3 surgeries for anal fissure    HX TUBAL LIGATION          HX WISDOM TEETH EXTRACTION         Family History   Problem Relation Age of Onset    Cancer Father         prostate    Hypertension Father     Heart defect Father         Heart blockage    Heart defect Mother     Glaucoma Mother    [facility] Elevated Lipids Mother     Arthritis-osteo Brother     Kidney Disease Maternal Grandmother     Cancer Maternal Grandfather         Lung cancer    Heart Attack Paternal Grandmother     Heart Attack Paternal Grandfather     Pancreatic Cancer Paternal Grandfather        Objective:  Vitals:    19 0956   BP: 117/78   Pulse: 61   Resp: 16   Temp: 98.3 °F (36.8 °C)   TempSrc: Oral   SpO2: 97%   Weight: 161 lb (73 kg)   Height: 5' 4\" (1.626 m)   PainSc:   0 - No pain   LMP: 2018       LABS   Results for orders placed or performed during the hospital encounter of 10/02/18   CBC WITH AUTOMATED DIFF   Result Value Ref Range    WBC 8.6 4.6 - 13.2 K/uL    RBC 4.44 4.20 - 5.30 M/uL    HGB 13.7 12.0 - 16.0 g/dL    HCT 42.5 35.0 - 45.0 %    MCV 95.7 74.0 - 97.0 FL    MCH 30.9 24.0 - 34.0 PG    MCHC 32.2 31.0 - 37.0 g/dL    RDW 13.2 11.6 - 14.5 %    PLATELET 972 013 - 655 K/uL    MPV 12.6 (H) 9.2 - 11.8 FL    NEUTROPHILS 62 40 - 73 %    LYMPHOCYTES 31 21 - 52 %    MONOCYTES 6 3 - 10 %    EOSINOPHILS 1 0 - 5 %    BASOPHILS 0 0 - 2 %    ABS. NEUTROPHILS 5.3 1.8 - 8.0 K/UL    ABS. LYMPHOCYTES 2.7 0.9 - 3.6 K/UL    ABS. MONOCYTES 0.5 0.05 - 1.2 K/UL    ABS. EOSINOPHILS 0.1 0.0 - 0.4 K/UL    ABS. BASOPHILS 0.0 0.0 - 0.1 K/UL    DF AUTOMATED     METABOLIC PANEL, COMPREHENSIVE   Result Value Ref Range    Sodium 139 136 - 145 mmol/L    Potassium 4.5 3.5 - 5.5 mmol/L    Chloride 105 100 - 108 mmol/L    CO2 26 21 - 32 mmol/L    Anion gap 8 3.0 - 18 mmol/L    Glucose 79 74 - 99 mg/dL    BUN 17 7.0 - 18 MG/DL    Creatinine 0.79 0.6 - 1.3 MG/DL    BUN/Creatinine ratio 22 (H) 12 - 20      GFR est AA >60 >60 ml/min/1.73m2    GFR est non-AA >60 >60 ml/min/1.73m2    Calcium 9.0 8.5 - 10.1 MG/DL    Bilirubin, total 0.5 0.2 - 1.0 MG/DL    ALT (SGPT) 20 13 - 56 U/L    AST (SGOT) 13 (L) 15 - 37 U/L    Alk.  phosphatase 37 (L) 45 - 117 U/L    Protein, total 7.6 6.4 - 8.2 g/dL    Albumin 4.2 3.4 - 5.0 g/dL    Globulin 3.4 2.0 - 4.0 g/dL    A-G Ratio 1.2 0.8 - 1.7     HEMOGLOBIN A1C WITH EAG   Result Value Ref Range    Hemoglobin A1c 5.2 4.2 - 5.6 %    Est. average glucose 103 mg/dL   LIPID PANEL   Result Value Ref Range    LIPID PROFILE          Cholesterol, total 162 <200 MG/DL    Triglyceride 57 <150 MG/DL    HDL Cholesterol 72 (H) 40 - 60 MG/DL    LDL, calculated 78.6 0 - 100 MG/DL    VLDL, calculated 11.4 MG/DL    CHOL/HDL Ratio 2.3 0 - 5.0     TSH 3RD GENERATION   Result Value Ref Range    TSH 2.10 0.36 - 3.74 uIU/mL   T4, FREE   Result Value Ref Range    T4, Free 1.0 0.7 - 1.5 NG/DL   URINALYSIS W/ RFLX MICROSCOPIC   Result Value Ref Range    Color YELLOW      Appearance CLEAR      Specific gravity 1.014 1.005 - 1.030      pH (UA) 5.5 5.0 - 8.0      Protein NEGATIVE  NEG mg/dL    Glucose NEGATIVE  NEG mg/dL    Ketone NEGATIVE  NEG mg/dL    Bilirubin NEGATIVE  NEG      Blood NEGATIVE  NEG      Urobilinogen 0.2 0.2 - 1.0 EU/dL    Nitrites NEGATIVE  NEG      Leukocyte Esterase NEGATIVE  NEG     VITAMIN D, 25 HYDROXY   Result Value Ref Range    Vitamin D 25-Hydroxy 33.6 30 - 100 ng/mL       TESTS  EKG 6/2018   Diagnosis   Final   Sinus bradycardia with short TX   Incomplete right bundle branch block   Nonspecific T wave abnormality   Abnormal ECG        PE  Physical Exam   Constitutional: She is oriented to person, place, and time. She appears well-developed and well-nourished. No distress. HENT:   Head: Normocephalic and atraumatic. Right Ear: External ear normal.   Left Ear: External ear normal.   Nose: Nose normal.   Mouth/Throat: Oropharynx is clear and moist. No oropharyngeal exudate. OME bilat and postnasal drip noted with a cobblestone appearance to the posterior oropharynx   Eyes: Pupils are equal, round, and reactive to light. Conjunctivae and EOM are normal. Right eye exhibits no discharge. Left eye exhibits no discharge. No scleral icterus. Neck: Normal range of motion. Neck supple. Cardiovascular: Normal rate, regular rhythm, normal heart sounds and intact distal pulses. No murmur heard. Pulmonary/Chest: Effort normal and breath sounds normal. No respiratory distress. She has no wheezes. She has no rales. She exhibits no tenderness. Abdominal: Soft. Bowel sounds are normal. She exhibits no distension and no mass. There is no tenderness. There is no rebound and no guarding. Musculoskeletal: Normal range of motion. She exhibits no edema, tenderness or deformity. Lymphadenopathy:     She has no cervical adenopathy. Neurological: She is alert and oriented to person, place, and time. She has normal reflexes. No cranial nerve deficit. Coordination normal.   Skin: Skin is warm and dry. No rash noted. She is not diaphoretic. No erythema. No pallor. Psychiatric: She has a normal mood and affect. Her behavior is normal. Judgment and thought content normal.   Vitals reviewed. Assessment/Plan:    1. Overweight- She is on the intermittent fasting diet, however reports at a plateau. She does go to the gym 2-3 times a week, cardio, weight lifting. Limiting weight lifting due to chronic knee pain. Try gluten free diet, refer nutrition. F/u 10/2019 for cpe, labs. 2. Chronic knee and back pain- weight management. Water aerobics. Referral to nutrition, BMI 27.64. 15 min conversation. Lab review: orders written for new lab studies as appropriate; see orders    Today's Visit:   Diagnoses and all orders for this visit:    1. Overweight (BMI 25.0-29.9)  -     REFERRAL TO NUTRITION    2. Routine physical examination  -     METABOLIC PANEL, COMPREHENSIVE; Future  -     VITAMIN D, 25 HYDROXY; Future      Health Maintenance: up to date. mammo due 10/2019    I have discussed the diagnosis with the patient and the intended plan as seen in the above orders. The patient has received an after-visit summary and questions were answered concerning future plans. I have discussed medication side effects and warnings with the patient as well. I have reviewed the plan of care with the patient, accepted their input and they are in agreement with the treatment goals. Follow-up and Dispositions    · Return in about 6 months (around 10/5/2019) for cpe and labs. More than 1/2 of this 60 minute visit was spent in counseling and coordination of care, as described above.     TAMMY Patel  Internist of [address], [address] [place] [address].  Phone: [phone]  Fax: [phone]

## 2019-09-24 ENCOUNTER — OFFICE VISIT (OUTPATIENT)
Dept: INTERNAL MEDICINE CLINIC | Age: 44
End: 2019-09-24

## 2019-09-24 VITALS
SYSTOLIC BLOOD PRESSURE: 112 MMHG | DIASTOLIC BLOOD PRESSURE: 69 MMHG | RESPIRATION RATE: 14 BRPM | WEIGHT: 164.4 LBS | HEART RATE: 64 BPM | HEIGHT: 64 IN | OXYGEN SATURATION: 97 % | BODY MASS INDEX: 28.07 KG/M2 | TEMPERATURE: 97.8 F

## 2019-09-24 DIAGNOSIS — J40 BRONCHITIS: Primary | ICD-10-CM

## 2019-09-24 DIAGNOSIS — M46.1 SACROILIITIS (HCC): ICD-10-CM

## 2019-09-24 DIAGNOSIS — R92.8 ABNORMAL MAMMOGRAM: ICD-10-CM

## 2019-09-24 DIAGNOSIS — D64.9 ANEMIA, UNSPECIFIED TYPE: ICD-10-CM

## 2019-09-24 RX ORDER — AZITHROMYCIN 250 MG/1
TABLET, FILM COATED ORAL
Qty: 6 TAB | Refills: 0 | Status: SHIPPED | OUTPATIENT
Start: 2019-09-24 | End: 2021-01-27 | Stop reason: ALTCHOICE

## 2019-09-24 RX ORDER — MULTIVIT WITH MINERALS/HERBS
1 TABLET ORAL DAILY
COMMUNITY

## 2019-09-24 RX ORDER — CHOLECALCIFEROL (VITAMIN D3) 125 MCG
220 CAPSULE ORAL
COMMUNITY

## 2019-09-24 NOTE — PROGRESS NOTES
INTERNISTS OF [facility]:  9/24/2019, MRN: [id number]      Sam Agee is a 38 y.o. female and presents to clinic for [address] (ROOM  3); Physical (with labs); Cough (x 1 month with coughing up clear-green phlegm); and Nasal Discharge (clear)    Subjective: The patient is a 50-year-old female with history of sacroiliitis. 1. Abnormal Mammogram: She had a mammogram in 2018. Findings are listed below. A follow-up study was recommended but it does not seem that the study was ever obtained. 10/12/18 Mammogram: Recommend diagnostic mammogram with 2-D/3-D spot compression views, ML view, and possibly ultrasound of 2 focal asymmetries in the upper mid left breast. BI-RADS Assessment Category 0: Incomplete. Need additional imaging evaluation. 2. Anemia: S/p hysterectomy. Per review of the [facility], the patient had anemia around the time she had her hysterectomy for uterine fibroids. No bleeding. 3. Cough: Present x 1 month. Sputum is green/clear. Associated sx include rhinorrhea (clear) and some generalized fatigue. Her sx were improving until yesterday. Yesterday she started having worsening congestion and sinus pressure/pain. No fever/chills. No SOB. No tobacco use. No sick contacts: her aunt. 4.  Sacroiliitis: Present for several months at least.  She continues to have pain along her lower spine/sacrum area. Symptoms improved with stretching. She is followed by Orthopedics and denies pain along her spine elsewhere. Symptoms do not appear to be worsening though. No paresthesias. No weakness. Pain can sometimes radiate to her hips. Patient Active Problem List    Diagnosis Date Noted    Sacroiliitis ([address] 09/24/2019    Overweight (BMI 25.0-29.9) 10/02/2018       Current Outpatient Medications   Medication Sig Dispense Refill    b complex vitamins tablet Take 1 Tab by mouth daily.  naproxen sodium (ALEVE) 220 mg cap Take 220 mg by mouth daily as needed.       Calcium-Cholecalciferol, D3, 500 mg(1,250mg) -400 unit tab Take  by mouth two (2) times a day.  KRILL OIL PO Take  by mouth daily.  B.infantis-B.ani-B.long-B.bifi (PROBIOTIC 4X) 10-15 mg TbEC Take  by mouth daily. Allergies   Allergen Reactions    Demerol [Meperidine] Other (comments)     Lowers BP    Meperidine (Pf) Other (comments)     DROPS BLOOD PRESSURE    Cat Dander Itching and Sneezing       History reviewed. No pertinent past medical history. Past Surgical History:   Procedure Laterality Date    HX  SECTION      Patient reports 2 in  &     HX [address]      Patient reports     HX GI      reported x 3 surgeries for anal fissure    HX TUBAL LIGATION          HX WISDOM TEETH EXTRACTION         Family History   Problem Relation Age of Onset    Cancer Father         prostate    Hypertension Father     Heart defect Father         Heart blockage    Heart defect Mother     Glaucoma Mother    [address] Elevated Lipids Mother     Arthritis-osteo Brother     Kidney Disease Maternal Grandmother     Cancer Maternal Grandfather         Lung cancer    Heart Attack Paternal Grandmother     Heart Attack Paternal Grandfather     Pancreatic Cancer Paternal Grandfather        Social History     Tobacco Use    Smoking status: Never Smoker    Smokeless tobacco: Never Used   Substance Use Topics    Alcohol use: Yes     Alcohol/week: 3.0 standard drinks     Types: 3 Glasses of wine per week     Comment: occiassional        ROS   Review of Systems   Constitutional: Negative for chills and fever. HENT: Negative for ear pain and sore throat. Eyes: Negative for blurred vision and pain. Respiratory: Negative for shortness of breath. Cardiovascular: Negative for chest pain. Gastrointestinal: Negative for abdominal pain. Genitourinary: Negative for dysuria and urgency.    Musculoskeletal: Positive for back pain (chronic) and joint pain (left knee pain improved since earlier this yr). Negative for myalgias. Skin: Negative for rash. Neurological: Negative for tingling, focal weakness and headaches. Endo/Heme/Allergies: Does not bruise/bleed easily. Psychiatric/Behavioral: Negative for substance abuse. Objective     Vitals:    09/24/19 1117   BP: 112/69   Pulse: 64   Resp: 14   Temp: 97.8 °F (36.6 °C)   TempSrc: Oral   SpO2: 97%   Weight: 164 lb 6.4 oz (74.6 kg)   Height: 5' 4\" (1.626 m)   PainSc:   4   PainLoc: Back   LMP: 09/11/2018       Physical Exam   Constitutional: She is oriented to person, place, and time and well-developed, well-nourished, and in no distress. HENT:   Head: Normocephalic and atraumatic. Right Ear: External ear normal.   Left Ear: External ear normal.   Mouth/Throat: Oropharynx is clear and moist. No oropharyngeal exudate. Sinus areas are nontender to palpation. Her nasal mucosa is erythematous. She has nasal congestion on exam.  Clear TMs. Eyes: Pupils are equal, round, and reactive to light. Conjunctivae and EOM are normal. Right eye exhibits no discharge. Left eye exhibits no discharge. No scleral icterus. Neck: Neck supple. Cardiovascular: Normal rate, regular rhythm, normal heart sounds and intact distal pulses. Exam reveals no gallop and no friction rub. No murmur heard. Pulmonary/Chest: Effort normal and breath sounds normal. No respiratory distress. She has no wheezes. She has no rales. Abdominal: Soft. Bowel sounds are normal. She exhibits no distension. There is no tenderness. There is no rebound and no guarding. Musculoskeletal: She exhibits no edema or tenderness (BUE). Lymphadenopathy:     She has no cervical adenopathy. Neurological: She is alert and oriented to person, place, and time. She exhibits normal muscle tone. Gait normal.   Skin: Skin is warm and dry. No erythema. Psychiatric: Affect normal.   Nursing note and vitals reviewed.       LABS   Data Review:   Lab Results   Component Value Date/Time    WBC 8.6 10/02/2018 12:00 PM    HGB 13.7 10/02/2018 12:00 PM    HCT 42.5 10/02/2018 12:00 PM    PLATELET 814 75/67/2317 12:00 PM    MCV 95.7 10/02/2018 12:00 PM       Lab Results   Component Value Date/Time    Sodium 139 10/02/2018 12:00 PM    Potassium 4.5 10/02/2018 12:00 PM    Chloride 105 10/02/2018 12:00 PM    CO2 26 10/02/2018 12:00 PM    Anion gap 8 10/02/2018 12:00 PM    Glucose 79 10/02/2018 12:00 PM    BUN 17 10/02/2018 12:00 PM    Creatinine 0.79 10/02/2018 12:00 PM    BUN/Creatinine ratio 22 (H) 10/02/2018 12:00 PM    GFR est AA >60 10/02/2018 12:00 PM    GFR est non-AA >60 10/02/2018 12:00 PM    Calcium 9.0 10/02/2018 12:00 PM       Lab Results   Component Value Date/Time    Cholesterol, total 162 10/02/2018 12:00 PM    HDL Cholesterol 72 (H) 10/02/2018 12:00 PM    LDL, calculated 78.6 10/02/2018 12:00 PM    VLDL, calculated 11.4 10/02/2018 12:00 PM    Triglyceride 57 10/02/2018 12:00 PM    CHOL/HDL Ratio 2.3 10/02/2018 12:00 PM       Lab Results   Component Value Date/Time    Hemoglobin A1c 5.2 10/02/2018 12:00 PM       Assessment/Plan:   1. Bronchitis:   -Ordering azithromycin.  -I encouraged her to take probiotics while on antibiotic.  -Return to clinic if symptoms worsen or do not resolve. ORDERS:  - azithromycin (ZITHROMAX) 250 mg tablet; Take 500mg (2 tabs) on Day 1 and then 250mg (1 tab) daily on Days 2-5. Dispense: 6 Tab; Refill: 0    2. Abnormal mammogram:   -We discussed the importance of getting a follow-up study. Ordering a mammogram and ultrasound    ORDERS:  - KITA MAMMO BI DX INCL CAD; Future  - US BREAST LT LIMITED=<3 QUAD; Future    3. Sacroiliitis: Stable. -Ordering a CRP for the patient to get. If elevated, may pursue HLA-B27 testing    ORDERS:  - C REACTIVE PROTEIN, QT; Future    4. Anemia: Around the time of the hysterectomy  -Ordering a CBC to ensure resolution of her anemia. ORDERS:  - CBC WITH AUTOMATED DIFF;  Future      There are no preventive care reminders to display for this patient. Lab review: labs are reviewed in the EHR and ordered as mentioned above    I have discussed the diagnosis with the patient and the intended plan as seen in the above orders. The patient has received an after-visit summary and questions were answered concerning future plans. I have discussed medication side effects and warnings with the patient as well. I have reviewed the plan of care with the patient, accepted their input and they are in agreement with the treatment goals. All questions were answered. The patient understands the plan of care. Handouts provided today with above information. Pt instructed if symptoms worsen to call the office or report to the ED for continued care. Greater than 50% of the visit time was spent in counseling and/or coordination of care. Voice recognition was used to generate this report, which may have resulted in some phonetic based errors in grammar and contents. Even though attempts were made to correct all the mistakes, some may have been missed, and remained in the body of the document.           Solomon Valdes MD

## 2019-09-24 NOTE — PATIENT INSTRUCTIONS
There are no preventive care reminders to display for this patient. Cough: Care Instructions  Your Care Instructions    A cough is your body's response to something that bothers your throat or airways. Many things can cause a cough. You might cough because of a cold or the flu, bronchitis, or asthma. Smoking, postnasal drip, allergies, and stomach acid that backs up into your throat also can cause coughs. A cough is a symptom, not a disease. Most coughs stop when the cause, such as a cold, goes away. You can take a few steps at home to cough less and feel better. Follow-up care is a key part of your treatment and safety. Be sure to make and go to all appointments, and call your doctor if you are having problems. It's also a good idea to know your test results and keep a list of the medicines you take. How can you care for yourself at home? · Drink lots of water and other fluids. This helps thin the mucus and soothes a dry or sore throat. Honey or lemon juice in hot water or tea may ease a dry cough. · Take cough medicine as directed by your doctor. · Prop up your head on pillows to help you breathe and ease a dry cough. · Try cough drops to soothe a dry or sore throat. Cough drops don't stop a cough. Medicine-flavored cough drops are no better than candy-flavored drops or hard candy. · Do not smoke. Avoid secondhand smoke. If you need help quitting, talk to your doctor about stop-smoking programs and medicines. These can increase your chances of quitting for good. When should you call for help? Call 911 anytime you think you may need emergency care.  For example, call if:    · You have severe trouble breathing.    Call your doctor now or seek immediate medical care if:    · You cough up blood.     · You have new or worse trouble breathing.     · You have a new or higher fever.     · You have a new rash.    Watch closely for changes in your health, and be sure to contact your doctor if:    · You cough more deeply or more often, especially if you notice more mucus or a change in the color of your mucus.     · You have new symptoms, such as a sore throat, an earache, or sinus pain.     · You do not get better as expected. Where can you learn more? Go to http://seferino-arleen.info/. Enter D279 in the search box to learn more about \"Cough: Care Instructions. \"  Current as of: June 9, 2019  Content Version: 12.2  © 6517-4885 [employer]. Care instructions adapted under license by [employer] (which disclaims liability or warranty for this information). If you have questions about a medical condition or this instruction, always ask your healthcare professional. [address] any warranty or liability for your use of this information.

## 2019-09-24 NOTE — PROGRESS NOTES
Chief Complaint   Patient presents with    Establish Care     ROOM  3    Physical     with labs    Cough     x 1 month with coughing up clear-green phlegm    Nasal Discharge     clear       1. Have you been to the ER, urgent care clinic since your last visit? Hospitalized since your last visit? No    2. Have you seen or consulted any other health care providers outside of the [address] [place] [address] since your last visit? Include any pap smears or colon screening. No    Patient was given a copy of the Advanced Directive and understands to bring it in once completed  There are no preventive care reminders to display for this patient.

## 2020-12-01 NOTE — PROGRESS NOTES
Vitamin D level is in good range on the low end of normal so continue supplementation with 800-1000 units daily of the Vitamin D and take calcium with it. No Yes

## 2021-01-27 ENCOUNTER — OFFICE VISIT (OUTPATIENT)
Dept: INTERNAL MEDICINE CLINIC | Age: 46
End: 2021-01-27
Payer: COMMERCIAL

## 2021-01-27 VITALS
TEMPERATURE: 98.2 F | RESPIRATION RATE: 18 BRPM | HEIGHT: 64 IN | BODY MASS INDEX: 29.37 KG/M2 | DIASTOLIC BLOOD PRESSURE: 80 MMHG | HEART RATE: 74 BPM | SYSTOLIC BLOOD PRESSURE: 116 MMHG | WEIGHT: 172 LBS | OXYGEN SATURATION: 98 %

## 2021-01-27 DIAGNOSIS — L30.9 ECZEMA, UNSPECIFIED TYPE: Primary | ICD-10-CM

## 2021-01-27 DIAGNOSIS — R22.0 FACIAL SWELLING: ICD-10-CM

## 2021-01-27 PROCEDURE — 99213 OFFICE O/P EST LOW 20 MIN: CPT | Performed by: NURSE PRACTITIONER

## 2021-01-27 RX ORDER — CETIRIZINE HCL 10 MG
10 TABLET ORAL
Qty: 30 TAB | Refills: 0
Start: 2021-01-27

## 2021-01-27 RX ORDER — METHYLPREDNISOLONE 4 MG/1
TABLET ORAL
Qty: 1 DOSE PACK | Refills: 0 | Status: SHIPPED | OUTPATIENT
Start: 2021-01-27 | End: 2021-04-08

## 2021-01-27 NOTE — PROGRESS NOTES
Internists of [address] [place] [address], [address] [place] [address] [facility]  [phone] [facility] [place][phone] fax        2021    Patient Ana Sagluero 1975     Primary MD MD Chris Gutierrez [place] a 54 y.o. female who presents with a sick visit for   Chief Complaint   Patient presents with    Facial Swelling     patient reports having facial pain, swelling, and redness x 1 day near the eyes. Bendadryl not effective. Patient denies any changes in products at home. Patient states yesterday she woke up with her face reddened and swollen. She has dry patches over both of her eyelids. She states this has happened to her once before during this time of the year and she knows this is most likely related to her flareup of eczema. She has dealt with eczema since her childhood. She states the weather is what causes her symptoms to worsen. She took Benadryl yesterday which was not effective except for put her to sleep. No past medical history on file. Past Surgical History:   Procedure Laterality Date    HX  SECTION      Patient reports 2 in  & 2011    HX [address]      Patient reports 2006    HX GI      reported x 3 surgeries for anal fissure    HX TUBAL LIGATION          HX WISDOM TEETH EXTRACTION         Social History     Socioeconomic History    Marital status:      Spouse name: Not on file    Number of children: Not on file    Years of education: Not on file    Highest education level: Not on file   Occupational History    Not on file   Social Needs    Financial resource strain: Not on file    Food insecurity     Worry: Not on file     Inability: Not on file    Transportation needs     Medical: Not on file     Non-medical: Not on file   Tobacco Use    Smoking status: Never Smoker    Smokeless tobacco: Never Used   Substance and Sexual Activity    Alcohol use:  Yes     Alcohol/week: 3.0 standard drinks Types: 3 Glasses of wine per week     Comment: occiassional     Drug use: No    Sexual activity: Yes     Partners: Male     Birth control/protection: None, Surgical     Comment: tubal ligation   Lifestyle    Physical activity     Days per week: Not on file     Minutes per session: Not on file    Stress: Not on file   Relationships    Social connections     Talks on phone: Not on file     Gets together: Not on file     Attends Roman [employer] service: Not on file     Active member of club or organization: Not on file     Attends meetings of clubs or organizations: Not on file     Relationship status: Not on file    Intimate partner violence     Fear of current or ex partner: Not on file     Emotionally abused: Not on file     Physically abused: Not on file     Forced sexual activity: Not on file   Other Topics Concern    Not on file   Social History Narrative    Not on file       Family History   Problem Relation Age of Onset    Cancer Father         prostate    Hypertension Father     Heart defect Father         Heart blockage    Heart defect Mother     Glaucoma Mother    [facility] Elevated Lipids Mother     Arthritis-osteo Brother     Kidney Disease Maternal Grandmother     Cancer Maternal Grandfather         Lung cancer    Heart Attack Paternal Grandmother     Heart Attack Paternal Grandfather     Pancreatic Cancer Paternal Grandfather        Current Outpatient Medications on File Prior to Visit   Medication Sig Dispense Refill    b complex vitamins tablet Take 1 Tab by mouth daily.  naproxen sodium (ALEVE) 220 mg cap Take 220 mg by mouth daily as needed.  Calcium-Cholecalciferol, D3, 500 mg(1,250mg) -400 unit tab Take  by mouth two (2) times a day.  KRILL OIL PO Take  by mouth daily.  B.infantis-B.ani-B.long-B.bifi (PROBIOTIC 4X) 10-15 mg TbEC Take  by mouth daily.       [DISCONTINUED] azithromycin (ZITHROMAX) 250 mg tablet Take 500mg (2 tabs) on Day 1 and then 250mg (1 tab) daily on Days 2-5. 6 Tab 0     No current facility-administered medications on file prior to visit. Objective    Visit Vitals  /80   Pulse 74   Temp 98.2 °F (36.8 °C) (Temporal)   Resp 18   Ht 5' 4\" (1.626 m)   Wt 172 lb (78 kg)   SpO2 98%   BMI 29.52 kg/m²       Physical Exam  HENT:      Head:        Comments: Red- redness   Eyes:           Assessment  1. Eczema, unspecified type    - methylPREDNISolone (Medrol, Jose,) 4 mg tablet; Use as directed  Dispense: 1 Dose Pack; Refill: 0  - cetirizine (ZYRTEC) 10 mg tablet; Take 1 Tab by mouth daily as needed for Other (eczema flare). Dispense: 30 Tab; Refill: 0    2. Facial swelling  No swelling to tongue, mouth, throat. No resp issues. - methylPREDNISolone (Medrol, Jose,) 4 mg tablet; Use as directed  Dispense: 1 Dose Pack; Refill: 0  - cetirizine (ZYRTEC) 10 mg tablet; Take 1 Tab by mouth daily as needed for Other (eczema flare). Dispense: 30 Tab; Refill: 0      Dr Lorri Yee. BETH No, DNP  Internist of [facility] spent 20 minutes with the patient in face-to-face consultation, of which greater than 50% was spent in counseling and coordination of care as described above.

## 2021-01-27 NOTE — PROGRESS NOTES
Emili Bell presents today for   Chief Complaint   Patient presents with    Facial Swelling     patient reports having facial pain, swelling, and redness x 1 day near the eyes. Bendadryl not effective. Patient denies any changes in products at home. Coordination of Care:  1. Have you been to the ER, urgent care clinic since your last visit? Hospitalized since your last visit? no    2. Have you seen or consulted any other health care providers outside of the [address] [place] [address] since your last visit? Include any pap smears or colon screening.  no

## 2021-04-08 ENCOUNTER — VIRTUAL VISIT (OUTPATIENT)
Dept: INTERNAL MEDICINE CLINIC | Age: 46
End: 2021-04-08
Payer: COMMERCIAL

## 2021-04-08 ENCOUNTER — TELEPHONE (OUTPATIENT)
Dept: INTERNAL MEDICINE CLINIC | Age: 46
End: 2021-04-08

## 2021-04-08 DIAGNOSIS — M25.562 CHRONIC PAIN OF BOTH KNEES: ICD-10-CM

## 2021-04-08 DIAGNOSIS — R63.5 WEIGHT GAIN: ICD-10-CM

## 2021-04-08 DIAGNOSIS — M25.561 CHRONIC PAIN OF BOTH KNEES: ICD-10-CM

## 2021-04-08 DIAGNOSIS — R92.8 ABNORMAL MAMMOGRAM: ICD-10-CM

## 2021-04-08 DIAGNOSIS — M46.1 SACROILIITIS (HCC): ICD-10-CM

## 2021-04-08 DIAGNOSIS — G89.29 CHRONIC PAIN OF BOTH KNEES: ICD-10-CM

## 2021-04-08 DIAGNOSIS — Z13.220 SCREENING FOR HYPERLIPIDEMIA: ICD-10-CM

## 2021-04-08 DIAGNOSIS — R07.9 CHEST PAIN, UNSPECIFIED TYPE: Primary | ICD-10-CM

## 2021-04-08 DIAGNOSIS — R10.9 ABDOMINAL DISCOMFORT: ICD-10-CM

## 2021-04-08 DIAGNOSIS — R07.9 CHEST PAIN, UNSPECIFIED TYPE: ICD-10-CM

## 2021-04-08 PROCEDURE — 99214 OFFICE O/P EST MOD 30 MIN: CPT | Performed by: INTERNAL MEDICINE

## 2021-04-08 NOTE — PROGRESS NOTES
Miguel Lazo is a [address] y.o. female who was seen by synchronous (real-time) audio-video technology on 4/8/2021. Assessment & Plan:   1. Abnormal Mammogram Hx: A follow-up study was done and unremarkable per her history. I encouraged her to get a follow-up mammogram this year. We will need to get her records. 2. Chest Pain: From costochondritis? Ordering an EKG and chest x-ray. [facility] labs. I will follow-up with her on April nineteenth at 3:15 PM.    3. B/l Knee Pain: From OA? Checking bilateral knee x-rays. Checking labs to rule out inflammatory arthritis. 4.  Sacroiliitis:  Checking labs and an x-ray. 5. Abdominal Discomfort: Etiology is unknown. Presently asymptomatic. She has a history of a hysterectomy. [address] labs. 6. Rash: Likely from eczema. Resolved with steroid therapy. Observation. 7.  Health Maintenance/General:  I encouraged her to get her COVID-19 vaccine. I will screen for hyperlipidemia and diabetes given her weight gain      Lab review: labs are reviewed in the EHR and ordered as mentioned above. I have discussed the diagnosis with the patient and the intended plan as seen in the above orders. I have discussed medication side effects and warnings with the patient as well. I have reviewed the plan of care with the patient, accepted their input and they are in agreement with the treatment goals. All questions were answered. The patient understands the plan of care. Pt instructed if symptoms worsen to call the office or report to the ED for continued care. Greater than 50% of the visit time was spent in counseling and/or coordination of care. Voice recognition was used to generate this report, which may have resulted in some phonetic based errors in grammar and contents. Even though attempts were made to correct all the mistakes, some may have been missed, and remained in the body of the document.       Subjective:   Miguel Lazo was seen for Chief Complaint   Patient presents with    Follow-up     The patient is a 65-year-old female with history of sacroiliitis. 1. Abnormal Mammogram:  She had an abnormal mammogram in 2018. A follow-up study was recommended and per her history today, he had a follow-up mammogram that was unremarkable. She is scheduled soon for her yearly mammogram.    10/12/18 Mammogram:   Recommend diagnostic mammogram with 2-D/3-D spot compression views, ML view, and possibly ultrasound of 2 focal asymmetries in the upper mid left breast. BI-RADS Assessment Category 0: Incomplete. Need additional imaging evaluation. 2. CP: She is reporting chest heaviness over the past 2 weeks, associated with shortness of breath. Chest pain is mostly along the right side of her chest is a firing/electrical quality type of pain. Pain will last seconds and is not associated with palpitations or shortness of breath episodes. Her shortness of breath episodes are purely random. She is having 1-2 episodes per week of this chest discomfort. 3.  Abdominal Discomfort: She has generalized abdominal cramping. The last time it occurred was 3 weeks ago. Pain was cramping in quality and not associated with blood in urine, blood in stool, or vaginal bleeding. No melena. No constipation or persistent diarrhea. No fever or chills. No alleviating factors are known. She is presently asymptomatic. 4.  General:  The patient reports that she has gained quite a bit of weight during the pandemic. 5.  Rash: She was seen in our office earlier this year with an eczematous type rash along her face. She was prescribed a Medrol Dosepak and responded well to this treatment. She is presently asymptomatic. 6.  Sacroiliitis History: She has a history of sacroiliitis and continues to have lower back pain that is sore in quality. No alleviating factors are known at this time. No paresthesias or weakness.     7. B/l Knee Pain: Pain is worse along her left knee versus her right knee. These joints do not give out on her. They are not associated with swelling. Pain is achy and sore. Prior to Admission medications    Medication Sig Start Date End Date Taking? Authorizing Provider   methylPREDNISolone (Medrol, Jose,) 4 mg tablet Use as directed 21   Neil No DNP   cetirizine (ZYRTEC) 10 mg tablet Take 1 Tab by mouth daily as needed for Other (eczema flare). 21   Renard RatelELEANOR   b complex vitamins tablet Take 1 Tab by mouth daily. Provider, Historical   naproxen sodium (ALEVE) 220 mg cap Take 220 mg by mouth daily as needed. Provider, Historical   Calcium-Cholecalciferol, D3, 500 mg(1,250mg) -400 unit tab Take  by mouth two (2) times a day. Provider, Historical   KRILL OIL PO Take  by mouth daily. Provider, Historical   B.infantis-B.ani-B.long-B.bifi (PROBIOTIC 4X) 10-15 mg TbEC Take  by mouth daily. Provider, Historical     Allergies   Allergen Reactions    Demerol [Meperidine] Other (comments)     Lowers BP    Meperidine (Pf) Other (comments)     DROPS BLOOD PRESSURE    Cat Dander Itching and Sneezing     No past medical history on file.   Past Surgical History:   Procedure Laterality Date    HX  SECTION      Patient reports 2 in  &     HX [address]      Patient reports 2006    HX GI      reported x 3 surgeries for anal fissure    HX TUBAL LIGATION          HX WISDOM TEETH EXTRACTION       Family History   Problem Relation Age of Onset    Cancer Father         prostate    Hypertension Father     Heart defect Father         Heart blockage    Heart defect Mother     Glaucoma Mother     Elevated Lipids Mother     Arthritis-osteo Brother     Kidney Disease Maternal Grandmother     Cancer Maternal Grandfather         Lung cancer    Heart Attack Paternal Grandmother     Heart Attack Paternal Grandfather     Pancreatic Cancer Paternal Grandfather      Social History Socioeconomic History    Marital status:      Spouse name: Not on file    Number of children: Not on file    Years of education: Not on file    Highest education level: Not on file   Tobacco Use    Smoking status: Never Smoker    Smokeless tobacco: Never Used   Substance and Sexual Activity    Alcohol use: Yes     Alcohol/week: 3.0 standard drinks     Types: 3 Glasses of wine per week     Comment: occiassional     Drug use: No    Sexual activity: Yes     Partners: Male     Birth control/protection: None, Surgical     Comment: tubal ligation       ROS:  Gen: No fever/chills  HEENT: No sore throat, eye pain, ear pain, or congestion. No HA  CV: No CP  Resp: She has a cough off/on and attributes it to allergies.   GI: See HPI  : No hematuria/dysuria  Derm: No rash  Neuro: No new paresthesias/weakness  Musc: See HPI  Psych: No depression sx      Objective:     General: alert, cooperative, no distress   Mental  status: mental status: alert, oriented to person, place, and time, normal mood, behavior, speech, dress, motor activity, and thought processes   Resp: resp: normal effort and no respiratory distress   Neuro: neuro: no gross deficits   Skin: skin: no discoloration or lesions of concern on visible areas     LABS:  Lab Results   Component Value Date/Time    Sodium 139 10/02/2018 12:00 PM    Potassium 4.5 10/02/2018 12:00 PM    Chloride 105 10/02/2018 12:00 PM    CO2 26 10/02/2018 12:00 PM    Anion gap 8 10/02/2018 12:00 PM    Glucose 79 10/02/2018 12:00 PM    BUN 17 10/02/2018 12:00 PM    Creatinine 0.79 10/02/2018 12:00 PM    BUN/Creatinine ratio 22 (H) 10/02/2018 12:00 PM    GFR est AA >60 10/02/2018 12:00 PM    GFR est non-AA >60 10/02/2018 12:00 PM    Calcium 9.0 10/02/2018 12:00 PM       Lab Results   Component Value Date/Time    Cholesterol, total 162 10/02/2018 12:00 PM    HDL Cholesterol 72 (H) 10/02/2018 12:00 PM    LDL, calculated 78.6 10/02/2018 12:00 PM    VLDL, calculated 11.4 10/02/2018 12:00 PM    Triglyceride 57 10/02/2018 12:00 PM    CHOL/HDL Ratio 2.3 10/02/2018 12:00 PM       Lab Results   Component Value Date/Time    WBC 8.6 10/02/2018 12:00 PM    HGB 13.7 10/02/2018 12:00 PM    HCT 42.5 10/02/2018 12:00 PM    PLATELET 275 03/98/6515 12:00 PM    MCV 95.7 10/02/2018 12:00 PM       Lab Results   Component Value Date/Time    Hemoglobin A1c 5.2 10/02/2018 12:00 PM       Lab Results   Component Value Date/Time    TSH 2.10 10/02/2018 12:00 PM           Due to this being a TeleHealth  evaluation, many elements of the physical examination are unable to be assessed. The pt was seen by synchronous (real-time) audio-video technology, and/or her healthcare decision maker, is aware that this patient-initiated, Telehealth encounter is a billable service, with coverage as determined by her insurance carrier. She is aware that she may receive a bill and has provided verbal consent to proceed: Yes. The pt is being evaluated by a video visit encounter for concerns as above. A caregiver was present when appropriate. Due to this being a TeleHealth encounter (During [facility] public health emergency), evaluation of the following organ systems was limited: Vitals/Constitutional/EENT/Resp/CV/GI//MS/Neuro/Skin/Heme-Lymph-Imm. Pursuant to the emergency declaration under the [facility], [facility]5 waiver authority and the [employer] Act, this Virtual  Visit was conducted, with patient's (and/or legal guardian's) consent, to reduce the patient's risk of exposure to COVID-19 and provide necessary medical care. Services were provided through a video synchronous discussion virtually to substitute for in-person clinic visit. Patient and provider were located at their individual homes. We discussed the expected course, resolution and complications of the diagnosis(es) in detail.   Medication risks, benefits, costs, interactions, and alternatives were discussed as indicated. I advised her to contact the office if her condition worsens, changes or fails to improve as anticipated. She expressed understanding with the diagnosis(es) and plan.      Sundeep Skaggs MD

## 2021-04-12 ENCOUNTER — HOSPITAL ENCOUNTER (OUTPATIENT)
Dept: GENERAL RADIOLOGY | Age: 46
Discharge: HOME | End: 2021-04-12
Payer: COMMERCIAL

## 2021-04-12 ENCOUNTER — HOSPITAL ENCOUNTER (OUTPATIENT)
Dept: LAB | Age: 46
Discharge: HOME | End: 2021-04-12
Payer: COMMERCIAL

## 2021-04-12 DIAGNOSIS — R10.9 ABDOMINAL DISCOMFORT: ICD-10-CM

## 2021-04-12 DIAGNOSIS — Z13.220 SCREENING FOR HYPERLIPIDEMIA: ICD-10-CM

## 2021-04-12 DIAGNOSIS — G89.29 CHRONIC PAIN OF BOTH KNEES: ICD-10-CM

## 2021-04-12 DIAGNOSIS — R07.9 CHEST PAIN, UNSPECIFIED TYPE: ICD-10-CM

## 2021-04-12 DIAGNOSIS — M25.562 CHRONIC PAIN OF BOTH KNEES: ICD-10-CM

## 2021-04-12 DIAGNOSIS — M25.561 CHRONIC PAIN OF BOTH KNEES: ICD-10-CM

## 2021-04-12 DIAGNOSIS — R63.5 WEIGHT GAIN: ICD-10-CM

## 2021-04-12 DIAGNOSIS — M46.1 SACROILIITIS (HCC): ICD-10-CM

## 2021-04-12 LAB
ALBUMIN SERPL-MCNC: 4.1 G/DL (ref 3.4–5)
ALBUMIN/GLOB SERPL: 1.3 {RATIO} (ref 0.8–1.7)
ALP SERPL-CCNC: 49 U/L (ref 45–117)
ALT SERPL-CCNC: 26 U/L (ref 13–56)
ANION GAP SERPL CALC-SCNC: 5 MMOL/L (ref 3–18)
APPEARANCE UR: CLEAR
AST SERPL-CCNC: 15 U/L (ref 10–38)
ATRIAL RATE: 56 BPM
BASOPHILS # BLD: 0 K/UL (ref 0–0.1)
BASOPHILS NFR BLD: 1 % (ref 0–2)
BILIRUB SERPL-MCNC: 0.4 MG/DL (ref 0.2–1)
BILIRUB UR QL: NEGATIVE
BUN SERPL-MCNC: 13 MG/DL (ref 7–18)
BUN/CREAT SERPL: 21 (ref 12–20)
CALCIUM SERPL-MCNC: 9.2 MG/DL (ref 8.5–10.1)
CALCULATED P AXIS, ECG09: 40 DEGREES
CALCULATED R AXIS, ECG10: 45 DEGREES
CALCULATED T AXIS, ECG11: 44 DEGREES
CHLORIDE SERPL-SCNC: 107 MMOL/L (ref 100–111)
CHOLEST SERPL-MCNC: 200 MG/DL
CO2 SERPL-SCNC: 29 MMOL/L (ref 21–32)
COLOR UR: YELLOW
CREAT SERPL-MCNC: 0.63 MG/DL (ref 0.6–1.3)
CRP SERPL-MCNC: 0.3 MG/DL (ref 0–0.3)
DIAGNOSIS, 93000: NORMAL
DIFFERENTIAL METHOD BLD: NORMAL
EOSINOPHIL # BLD: 0.1 K/UL (ref 0–0.4)
EOSINOPHIL NFR BLD: 1 % (ref 0–5)
ERYTHROCYTE [DISTWIDTH] IN BLOOD BY AUTOMATED COUNT: 12.5 % (ref 11.6–14.5)
GLOBULIN SER CALC-MCNC: 3.1 G/DL (ref 2–4)
GLUCOSE SERPL-MCNC: 87 MG/DL (ref 74–99)
GLUCOSE UR STRIP.AUTO-MCNC: NEGATIVE MG/DL
HBA1C MFR BLD: 5.4 % (ref 4.2–5.6)
HCT VFR BLD AUTO: 39.4 % (ref 35–45)
HDLC SERPL-MCNC: 65 MG/DL (ref 40–60)
HDLC SERPL: 3.1 {RATIO} (ref 0–5)
HGB BLD-MCNC: 13 G/DL (ref 12–16)
HGB UR QL STRIP: NEGATIVE
KETONES UR QL STRIP.AUTO: NEGATIVE MG/DL
LDLC SERPL CALC-MCNC: 117 MG/DL (ref 0–100)
LEUKOCYTE ESTERASE UR QL STRIP.AUTO: NEGATIVE
LIPID PROFILE,FLP: ABNORMAL
LYMPHOCYTES # BLD: 2.6 K/UL (ref 0.9–3.6)
LYMPHOCYTES NFR BLD: 37 % (ref 21–52)
MCH RBC QN AUTO: 30.8 PG (ref 24–34)
MCHC RBC AUTO-ENTMCNC: 33 G/DL (ref 31–37)
MCV RBC AUTO: 93.4 FL (ref 74–97)
MONOCYTES # BLD: 0.4 K/UL (ref 0.05–1.2)
MONOCYTES NFR BLD: 6 % (ref 3–10)
NEUTS SEG # BLD: 3.9 K/UL (ref 1.8–8)
NEUTS SEG NFR BLD: 55 % (ref 40–73)
NITRITE UR QL STRIP.AUTO: NEGATIVE
P-R INTERVAL, ECG05: 118 MS
PH UR STRIP: 8 [PH] (ref 5–8)
PLATELET # BLD AUTO: 285 K/UL (ref 135–420)
PMV BLD AUTO: 11.1 FL (ref 9.2–11.8)
POTASSIUM SERPL-SCNC: 4.1 MMOL/L (ref 3.5–5.5)
PROT SERPL-MCNC: 7.2 G/DL (ref 6.4–8.2)
PROT UR STRIP-MCNC: NEGATIVE MG/DL
Q-T INTERVAL, ECG07: 474 MS
QRS DURATION, ECG06: 82 MS
QTC CALCULATION (BEZET), ECG08: 457 MS
RBC # BLD AUTO: 4.22 M/UL (ref 4.2–5.3)
SODIUM SERPL-SCNC: 141 MMOL/L (ref 136–145)
SP GR UR REFRACTOMETRY: 1.01 (ref 1–1.03)
T4 FREE SERPL-MCNC: 0.9 NG/DL (ref 0.7–1.5)
TRIGL SERPL-MCNC: 90 MG/DL (ref ?–150)
TSH SERPL DL<=0.05 MIU/L-ACNC: 4.49 UIU/ML (ref 0.36–3.74)
URATE SERPL-MCNC: 3.3 MG/DL (ref 2.6–7.2)
UROBILINOGEN UR QL STRIP.AUTO: 0.2 EU/DL (ref 0.2–1)
VENTRICULAR RATE, ECG03: 56 BPM
VLDLC SERPL CALC-MCNC: 18 MG/DL
WBC # BLD AUTO: 7 K/UL (ref 4.6–13.2)

## 2021-04-12 PROCEDURE — 81003 URINALYSIS AUTO W/O SCOPE: CPT

## 2021-04-12 PROCEDURE — 71046 X-RAY EXAM CHEST 2 VIEWS: CPT

## 2021-04-12 PROCEDURE — 85025 COMPLETE CBC W/AUTO DIFF WBC: CPT

## 2021-04-12 PROCEDURE — 73564 X-RAY EXAM KNEE 4 OR MORE: CPT

## 2021-04-12 PROCEDURE — 80061 LIPID PANEL: CPT

## 2021-04-12 PROCEDURE — 86140 C-REACTIVE PROTEIN: CPT

## 2021-04-12 PROCEDURE — 80053 COMPREHEN METABOLIC PANEL: CPT

## 2021-04-12 PROCEDURE — 86200 CCP ANTIBODY: CPT

## 2021-04-12 PROCEDURE — 72114 X-RAY EXAM L-S SPINE BENDING: CPT

## 2021-04-12 PROCEDURE — 36415 COLL VENOUS BLD VENIPUNCTURE: CPT

## 2021-04-12 PROCEDURE — 83036 HEMOGLOBIN GLYCOSYLATED A1C: CPT

## 2021-04-12 PROCEDURE — 86235 NUCLEAR ANTIGEN ANTIBODY: CPT

## 2021-04-12 PROCEDURE — 84550 ASSAY OF BLOOD/URIC ACID: CPT

## 2021-04-12 PROCEDURE — 84439 ASSAY OF FREE THYROXINE: CPT

## 2021-04-12 PROCEDURE — 93005 ELECTROCARDIOGRAM TRACING: CPT

## 2021-04-12 NOTE — PROGRESS NOTES
Please let her know that her EKG does not show any new findings. She has a normal rhythm (sinus).      Dr. Deena Alas  Internists of [facility], [address] [place] [address], [address] [place] [address] Str.  Phone: [phone]  Fax: [phone]

## 2021-04-13 LAB
CENTROMERE B AB SER-ACNC: <0.2 AI (ref 0–0.9)
CHROMATIN AB SERPL-ACNC: <0.2 AI (ref 0–0.9)
DSDNA AB SER-ACNC: 1 IU/ML (ref 0–9)
ENA JO1 AB SER-ACNC: <0.2 AI (ref 0–0.9)
ENA RNP AB SER-ACNC: 0.2 AI (ref 0–0.9)
ENA SCL70 AB SER-ACNC: <0.2 AI (ref 0–0.9)
ENA SM AB SER-ACNC: <0.2 AI (ref 0–0.9)
ENA SS-A AB SER-ACNC: <0.2 AI (ref 0–0.9)
ENA SS-B AB SER-ACNC: 0.5 AI (ref 0–0.9)
SEE BELOW, 164869: NORMAL

## 2021-04-13 NOTE — PROGRESS NOTES
I will let her know at her upcoming appointment that her urinalysis, uric acid level, CRP, CBC, and A1c are normal.  Meanwhile, her total cholesterol is 200. Triglycerides are 90. HDL is 65. Her LDL is 117, up from 78 two years ago. Her TSH is mildly elevated at 4.49. Her free T4 is low normal at 0.9.     Dr. Santa Acevedo  Internists of [address], [address] [place], [place] [address]  Phone: [phone]  Fax: [phone]

## 2021-04-14 NOTE — PROGRESS NOTES
There is an area on the chest x-ray is likely a shadow vs artifact but the radiologist is recommending a chest CT to better visualize the area and to confirm that there is no abnormality present. I am ordering a stress CT as result. Also let her know that her lumbar x-rays show findings consistent with lumbar disc disease and arthritis. Her knee x-rays show evidence of bone spurs and osteoarthritis. There is worsening of her sacroiliitis. She is agreeable to seeing orthopedics, please place a referral for me to sign for treatment recommendations.     Dr. Gerri Brown  Internists of [facility], [address] [place] [address], [address] [place] [address].  Phone: [phone]  Fax: [phone]

## 2021-04-15 ENCOUNTER — TELEPHONE (OUTPATIENT)
Dept: INTERNAL MEDICINE CLINIC | Age: 46
End: 2021-04-15

## 2021-04-15 DIAGNOSIS — M51.9 LUMBAR DISC DISEASE: ICD-10-CM

## 2021-04-15 DIAGNOSIS — R93.89 ABNORMAL CHEST X-RAY: ICD-10-CM

## 2021-04-15 DIAGNOSIS — G89.29 CHRONIC PAIN OF BOTH KNEES: ICD-10-CM

## 2021-04-15 DIAGNOSIS — M25.562 CHRONIC PAIN OF BOTH KNEES: ICD-10-CM

## 2021-04-15 DIAGNOSIS — M25.561 CHRONIC PAIN OF BOTH KNEES: ICD-10-CM

## 2021-04-15 DIAGNOSIS — M53.3 SACROILIAC JOINT PAIN: Primary | ICD-10-CM

## 2021-04-15 NOTE — TELEPHONE ENCOUNTER
Referral order generated and signed. Chest CT ordered.     Dr. Candice Guerrier  Internists of [facility], [address] [place] [address], [facility]  Phone: [phone]  Fax: [phone]

## 2021-04-15 NOTE — TELEPHONE ENCOUNTER
----- Message from Marvin Marinelli MD sent at 4/14/2021 10:16 AM EDT -----  There is an area on the chest x-ray is likely a shadow vs artifact but the radiologist is recommending a chest CT to better visualize the area and to confirm that there is no abnormality present. I am ordering a stress CT as result. Also let her know that her lumbar x-rays show findings consistent with lumbar disc disease and arthritis. Her knee x-rays show evidence of bone spurs and osteoarthritis. There is worsening of her sacroiliitis. She is agreeable to seeing orthopedics, please place a referral for me to sign for treatment recommendations.     Dr. Knapp Frames  Internists of [address]  Phone: [phone]  Fax: [phone]

## 2021-04-16 NOTE — PROGRESS NOTES
Kaitlin Donaldo presents today for Chief Complaint Patient presents with  Follow-up  Results  Labs 4-12-21 Coordination of Care: 1. Have you been to the ER, urgent care clinic since your last visit? Hospitalized since your last visit? NO 
 
2. Have you seen or consulted any other health care providers outside of the [address] [place] [address] since your last visit? Include any pap smears or colon screening.  NO

## 2021-04-17 LAB
14.3.3 ETA, RHEUM. ARTHRITIS: <0.2 NG/ML
CCP IGA+IGG SERPL IA-ACNC: <20 UNITS
RHEUMATOID FACT SERPL-ACNC: 14.4 UNITS/ML

## 2021-04-19 ENCOUNTER — OFFICE VISIT (OUTPATIENT)
Dept: INTERNAL MEDICINE CLINIC | Age: 46
End: 2021-04-19
Payer: COMMERCIAL

## 2021-04-19 VITALS
OXYGEN SATURATION: 96 % | DIASTOLIC BLOOD PRESSURE: 75 MMHG | RESPIRATION RATE: 16 BRPM | WEIGHT: 174 LBS | HEART RATE: 66 BPM | SYSTOLIC BLOOD PRESSURE: 116 MMHG | HEIGHT: 64 IN | BODY MASS INDEX: 29.71 KG/M2 | TEMPERATURE: 97.8 F

## 2021-04-19 DIAGNOSIS — M53.3 CHRONIC LEFT SI JOINT PAIN: ICD-10-CM

## 2021-04-19 DIAGNOSIS — G89.29 CHRONIC PAIN OF BOTH KNEES: ICD-10-CM

## 2021-04-19 DIAGNOSIS — R79.89 ABNORMAL TSH: ICD-10-CM

## 2021-04-19 DIAGNOSIS — M25.562 CHRONIC PAIN OF BOTH KNEES: ICD-10-CM

## 2021-04-19 DIAGNOSIS — R06.02 SOB (SHORTNESS OF BREATH): Primary | ICD-10-CM

## 2021-04-19 DIAGNOSIS — E78.5 HYPERLIPIDEMIA, UNSPECIFIED HYPERLIPIDEMIA TYPE: ICD-10-CM

## 2021-04-19 DIAGNOSIS — J45.909 UNCOMPLICATED ASTHMA, UNSPECIFIED ASTHMA SEVERITY, UNSPECIFIED WHETHER PERSISTENT: ICD-10-CM

## 2021-04-19 DIAGNOSIS — G89.29 CHRONIC LEFT SI JOINT PAIN: ICD-10-CM

## 2021-04-19 DIAGNOSIS — M25.561 CHRONIC PAIN OF BOTH KNEES: ICD-10-CM

## 2021-04-19 PROCEDURE — 99214 OFFICE O/P EST MOD 30 MIN: CPT | Performed by: INTERNAL MEDICINE

## 2021-04-19 NOTE — PROGRESS NOTES
Benjigermaine De La Cruz presents today for   Chief Complaint   Patient presents with    Follow-up     Chest tightness    Results     xray/ekg   [facility]     4-12-21                Coordination of Care:  1. Have you been to the ER, urgent care clinic since your last visit? Hospitalized since your last visit? no    2. Have you seen or consulted any other health care providers outside of the [address] [place] [address] since your last visit? Include any pap smears or colon screening.  yes

## 2021-04-19 NOTE — PROGRESS NOTES
INTERNISTS OF [facility]:  4/19/2021, MRN: [id number]      Yesi Garza is a 51 y.o. female and presents to clinic for Follow-up (Chest tightness), Results (xray/ekg), and Labs (4-12-21)    Subjective: The patient is a 48-year-old female with history of sacroiliitis. 1. Chest Pain: She has b/l chest tightness with SOB off/on. She has allergic rhinitis sx as well. Several family members also have similar sx. +Coughing - clear mucous but mostly she has a dry cough. No SOB today. Muscle massage of her back and use of topical rx on her back improves her chest tightness. She had a CXR to further investigate her sx. Findings are listed below. As a result, a chest CT was ordered per Radiology recommendations. 4/12/21 CXR: 11 mm asymmetric density at the lateral left midlung zone, favor projectional artifact due to overlapping shadows although a pulmonary nodule is not excluded. Recommend either dedicated chest CT or repeat radiograph with apical lordotic views to further evaluate. 2. Abnormal TSH: Her most recent labs show that her fT4 is low normal and her TSH is mildly elevated at 4.49. She does not have a h/o thyroid disease. Results for Giancarlo Foster (MRN [id number]) as of 4/19/2021 15:53   Ref. Range 4/12/2021 12:24   T4, Free Latest Ref Range: 0.7 - 1.5 NG/DL 0.9   TSH Latest Ref Range: 0.36 - 3.74 uIU/mL 4.49 (H)     3. HLD: Her recent labs show an LDL of 117. She is not on cholesterol lowering rx.     4. B/l Knee Pain: Since her last apt, she had xrays that showed findings c/w OA (right is worse than her left knee). She is interested in getting a referral to see Orthopedics. 5. Sacroilitis: Chronic. She has chronic lower back pain. A lumbar spine xray was obtained. Findings are listed below. 4/12/21 Lumbar spine xray: Mild multilevel degenerative changes as above. Unchanged appearance of the bilateral sacroiliac joints, with increased sclerosis about the left sacroiliac joint.  This may reflect chronic sacroiliitis or stress-related change. The presence of vacuum phenomenon suggests a component of osteoarthritis. Patient Active Problem List    Diagnosis Date Noted    Overweight (BMI 25.0-29.9) 10/02/2018       Current Outpatient Medications   Medication Sig Dispense Refill    cetirizine (ZYRTEC) 10 mg tablet Take 1 Tab by mouth daily as needed for Other (eczema flare). 30 Tab 0    b complex vitamins tablet Take 1 Tab by mouth daily.  naproxen sodium (ALEVE) 220 mg cap Take 220 mg by mouth daily as needed.  Calcium-Cholecalciferol, D3, 500 mg(1,250mg) -400 unit tab Take  by mouth two (2) times a day.  KRILL OIL PO Take  by mouth daily.  B.infantis-B.ani-B.long-B.bifi (PROBIOTIC 4X) 10-15 mg TbEC Take  by mouth daily. Allergies   Allergen Reactions    Demerol [Meperidine] Other (comments)     Lowers BP    Meperidine (Pf) Other (comments)     DROPS BLOOD PRESSURE    Cat Dander Itching and Sneezing       No past medical history on file. Past Surgical History:   Procedure Laterality Date    HX  SECTION      Patient reports 2 in  &     HX [address]      Patient reports     HX GI      reported x 3 surgeries for anal fissure    HX TUBAL LIGATION          HX WISDOM TEETH EXTRACTION         Family History   Problem Relation Age of Onset    Cancer Father         prostate    Hypertension Father     Heart defect Father         Heart blockage    Heart defect Mother     Glaucoma Mother    Debi Headley Elevated Lipids Mother     Arthritis-osteo Brother     Kidney Disease Maternal Grandmother     Cancer Maternal Grandfather         Lung cancer    Heart Attack Paternal Grandmother     Heart Attack Paternal Grandfather     Pancreatic Cancer Paternal Grandfather        Social History     Tobacco Use    Smoking status: Never Smoker    Smokeless tobacco: Never Used   Substance Use Topics    Alcohol use:  Yes     Alcohol/week: 3.0 standard drinks     Types: 3 Glasses of wine per week     Comment: occiassional        ROS   Review of Systems   Constitutional: Negative for chills and fever. HENT: Negative for ear pain and sore throat. Eyes: Negative for blurred vision and pain. Respiratory: Positive for shortness of breath. Negative for cough. Cardiovascular: Positive for chest pain (described as chest tightness). Gastrointestinal: Negative for abdominal pain, blood in stool and melena. Genitourinary: Negative for dysuria and hematuria. Musculoskeletal: Positive for back pain. Negative for joint pain and myalgias. Skin: Negative for rash. Neurological: Negative for headaches. Endo/Heme/Allergies: Does not bruise/bleed easily. Psychiatric/Behavioral: Negative for substance abuse. Objective     Vitals:    04/19/21 1526   BP: 116/75   Pulse: 66   Resp: 16   Temp: 97.8 °F (36.6 °C)   TempSrc: Temporal   SpO2: 96%   Weight: 174 lb (78.9 kg)   Height: 5' 4\" (1.626 m)   PainSc:   4   PainLoc: Generalized   LMP: 09/11/2018       Physical Exam  Vitals signs and nursing note reviewed. HENT:      Head: Normocephalic and atraumatic. Right Ear: External ear normal.      Left Ear: External ear normal.   Eyes:      General: No scleral icterus. Right eye: No discharge. Left eye: No discharge. Conjunctiva/sclera: Conjunctivae normal.   Neck:      Musculoskeletal: Neck supple. Cardiovascular:      Rate and Rhythm: Normal rate and regular rhythm. Heart sounds: Murmur (soft 1/6 early systolic murmur lsb) present. No friction rub. No gallop. Pulmonary:      Effort: Pulmonary effort is normal. No respiratory distress. Breath sounds: Normal breath sounds. No wheezing or rales. Chest:      Chest wall: No tenderness. Abdominal:      General: Bowel sounds are normal. There is no distension. Palpations: Abdomen is soft. There is no mass. Tenderness: There is no abdominal tenderness. There is no guarding or rebound. Musculoskeletal:         General: No swelling (BUE) or tenderness (BUE). Comments: Adequate ROM along b/l knee jts with crepitus on exam. Her lumbar spinous processes are mildly TTP along her SI jt   Lymphadenopathy:      Cervical: No cervical adenopathy. Skin:     General: Skin is warm and dry. Findings: No erythema. Neurological:      Mental Status: She is alert and oriented to person, place, and time. Motor: No abnormal muscle tone. Gait: Gait is intact. Gait normal.   Psychiatric:         Mood and Affect: Mood and affect normal.         LABS   Data Review:   Lab Results   Component Value Date/Time    WBC 7.0 04/12/2021 12:24 PM    HGB 13.0 04/12/2021 12:24 PM    HCT 39.4 04/12/2021 12:24 PM    PLATELET 779 54/98/7809 12:24 PM    MCV 93.4 04/12/2021 12:24 PM       Lab Results   Component Value Date/Time    Sodium 141 04/12/2021 12:24 PM    Potassium 4.1 04/12/2021 12:24 PM    Chloride 107 04/12/2021 12:24 PM    CO2 29 04/12/2021 12:24 PM    Anion gap 5 04/12/2021 12:24 PM    Glucose 87 04/12/2021 12:24 PM    BUN 13 04/12/2021 12:24 PM    Creatinine 0.63 04/12/2021 12:24 PM    BUN/Creatinine ratio 21 (H) 04/12/2021 12:24 PM    GFR est AA >60 04/12/2021 12:24 PM    GFR est non-AA >60 04/12/2021 12:24 PM    Calcium 9.2 04/12/2021 12:24 PM       Lab Results   Component Value Date/Time    Cholesterol, total 200 (H) 04/12/2021 12:24 PM    HDL Cholesterol 65 (H) 04/12/2021 12:24 PM    LDL, calculated 117 (H) 04/12/2021 12:24 PM    VLDL, calculated 18 04/12/2021 12:24 PM    Triglyceride 90 04/12/2021 12:24 PM    CHOL/HDL Ratio 3.1 04/12/2021 12:24 PM       Lab Results   Component Value Date/Time    Hemoglobin A1c 5.4 04/12/2021 12:24 PM       Assessment/Plan:   1. SOB: +From asthma? - Referral to Pulmonology. ORDERS:  - REFERRAL TO PULMONARY DISEASE    2. HLD: LDL is 117.    - I encouraged her to maintain a heart healthy diet in order to reduce her weight and to improve her cholesterol. I will recheck her cholesterol in a year. 3. Abnormal TSH:  - Rechecking TFTs in 4-6 wks. If her TFTs are unchanged, I will start her on Synthroid. 4. B/l Knee Pain: Findings are c/w OA.  - Referral to Orthopedics.   - Activity as tolerated. 5. Chronic SI Joint Pain: She tested negative per recent labs for inflammatory arthritis (unremarkakble SANDHYA and RF panels; her CRP was also unremarkable)  - Referral to Orthopedics.  - Activity as tolerated. Health Maintenance Due   Topic Date Due    Hepatitis C Screening  Never done    COVID-19 Vaccine (1) Never done     Lab review: labs are reviewed in the EHR and ordered as mentioned above    I have discussed the diagnosis with the patient and the intended plan as seen in the above orders. The patient has received an after-visit summary and questions were answered concerning future plans. I have discussed medication side effects and warnings with the patient as well. I have reviewed the plan of care with the patient, accepted their input and they are in agreement with the treatment goals. All questions were answered. The patient understands the plan of care. Handouts provided today with above information. Pt instructed if symptoms worsen to call the office or report to the ED for continued care. Greater than 50% of the visit time was spent in counseling and/or coordination of care. Voice recognition was used to generate this report, which may have resulted in some phonetic based errors in grammar and contents. Even though attempts were made to correct all the mistakes, some may have been missed, and remained in the body of the document.           Teresa Calderon MD

## 2021-04-26 PROBLEM — M53.3 CHRONIC LEFT SI JOINT PAIN: Status: ACTIVE | Noted: 2021-04-26

## 2021-04-26 PROBLEM — G89.29 CHRONIC LEFT SI JOINT PAIN: Status: ACTIVE | Noted: 2021-04-26

## 2021-04-26 PROBLEM — E78.5 HYPERLIPIDEMIA: Status: ACTIVE | Noted: 2021-04-26

## 2021-04-26 PROBLEM — M17.0 PRIMARY OSTEOARTHRITIS OF BOTH KNEES: Status: ACTIVE | Noted: 2021-04-26

## 2021-05-28 ENCOUNTER — HOSPITAL ENCOUNTER (OUTPATIENT)
Dept: LAB | Age: 46
Discharge: HOME | End: 2021-05-28
Payer: COMMERCIAL

## 2021-05-28 ENCOUNTER — APPOINTMENT (OUTPATIENT)
Dept: INTERNAL MEDICINE CLINIC | Age: 46
End: 2021-05-28

## 2021-05-28 DIAGNOSIS — R79.89 ABNORMAL TSH: ICD-10-CM

## 2021-05-28 LAB
T4 FREE SERPL-MCNC: 0.8 NG/DL (ref 0.7–1.5)
TSH SERPL DL<=0.05 MIU/L-ACNC: 3.57 UIU/ML (ref 0.36–3.74)

## 2021-05-28 PROCEDURE — 84439 ASSAY OF FREE THYROXINE: CPT

## 2021-06-02 NOTE — PROGRESS NOTES
1. Have you been to the ER, urgent care clinic or hospitalized since your last visit? NO.  
 
2. Have you seen or consulted any other health care providers outside of the [address] [place] [address] since your last visit (Include any pap smears or colon screening)? NO Do you have an Advanced Directive? NO Would you like information on Advanced Directives?  NO 
 
 [FreeTextEntry1] : 1) I discussed with Lachelle that a 20  day interval is not that concerning but should not be occuring with COCs. We did discuss that irregular bleeding can occur within the first 3 months of OCP usage\par 2) Rx for serum hormone testing and pelvic imaging issued\par 3) in the interim she will continue with 600mg Advil q/6 hours with food as needed\par \par will f/u pending receipt of results\par \par 30 minutes were spent face to face in this visit with greater than 50% of the time spent counseling\par \par

## 2021-06-07 NOTE — PROGRESS NOTES
Patient is aware:    Please let her know that her TFTs are normal.     Patient verbalizes understanding.

## 2021-06-07 NOTE — PROGRESS NOTES
Please let her know that her TFTs are normal.    Dr. Toñito Gutierrez  [facility], [address] [facility] [facility], [address]  Phone: [phone]  Fax: [phone]

## 2021-06-23 NOTE — PROGRESS NOTES
[facility]  [address], [place]  Phone: [phone]  Fax: [phone]        INITIAL CONSULTATION      HISTORY OF PRESENT ILLNESS:  Kady Herman is a 51 y.o. female whom is referred from Josefa Pulido MD secondary to progressive low back and buttocks pain radiating into the BLE (RLE>LLE) in a L4 distribution to the knee in the RLE and mid-thigh in the LLE x a couple of years without trauma. She rates her pain 8/10. Her pain is exacerbated by standing and sitting. She has treated with Motrin with minimal benefit. Patient denies previous spinal surgery, injections, or physical therapy/chiropractic care. Pt denies change in bowel or bladder habits. Pt denies fever, weight loss, or skin changes. PmHx of hysterectomy. Note from Josefa Pulido MD dated  indicating patient was seen with c/o chronic sacroiliitis, low back pain. A1c was 5.4 on 2021. Tested negative recently for inflammatory arthritis. Unremarkable SANDHYA and RF; CRP was unremarkable. Referred to spine. L spine XR dated 2021 films independently reviewed. Per report, mild multilevel degenerative changes. Unchanged appearance of the bilateral sacroiliac joints, with increased sclerosis about the left sacroiliac joint. This may reflect chronic sacroiliitis or stress-related change. The presence of vacuum phenomenon suggests a component of osteoarthritis. I independently reviewed the films and noted: mild disc space narrowing at L3-4, L4-5, and L5-1      The patient is RHD.  reviewed. Body mass index is 29.94 kg/m². PCP: Josefa Pulido MD    History reviewed. No pertinent past medical history.  HX   Past Surgical History:   Procedure Laterality Date    HX  SECTION      Patient reports 2 in  &     HX [address]      Patient reports 2006    HX GI      reported x 3 surgeries for anal fissure    HX TUBAL LIGATION          HX WISDOM TEETH EXTRACTION      SECTION      Patient reports 2 in  &    [id number]       Patient reports     HX GI      reported x 3 surgeries for anal fissure    HX TUBAL LIGATION          HX WISDOM TEETH EXTRACTION          Tobacco Use    Smoking status: Never Smoker    Smokeless tobacco: Never Used   Substance Use Topics    Alcohol use: Yes     Alcohol/week: 3.0 standard drinks     Types: 3 Glasses of wine per week     Comment: occiassional        Work status: The patient is employed. Marital status: . Current Outpatient Medications   Medication Sig Dispense Refill    fexofenadine-pseudoephedrine (Allegra-D 24 Hour) 180-240 mg per tablet Take 1 Tablet by mouth daily.  ibuprofen (MOTRIN) 200 mg tablet Take 400 mg by mouth as needed for Pain.  methylPREDNISolone (MEDROL DOSEPACK) 4 mg tablet Per dose pack instructions 1 Dose Pack 0    b complex vitamins tablet Take 1 Tab by mouth daily.  naproxen sodium (ALEVE) 220 mg cap Take 220 mg by mouth daily as needed.  Calcium-Cholecalciferol, D3, 500 mg(1,250mg) -400 unit tab Take  by mouth two (2) times a day.  KRILL OIL PO Take  by mouth daily.  B.infantis-B.ani-B.long-B.bifi (PROBIOTIC 4X) 10-15 mg TbEC Take  by mouth daily.  cetirizine (ZYRTEC) 10 mg tablet Take 1 Tab by mouth daily as needed for Other (eczema flare).  (Patient not taking: Reported on 2021) 30 Tab 0       Allergies   Allergen Reactions    Demerol [Meperidine] Other (comments)     Lowers BP    Meperidine (Pf) Other (comments)     DROPS BLOOD PRESSURE    Cat Dander Itching and Sneezing            Family History   Problem Relation Age of Onset    Cancer Father         prostate    Hypertension Father     Heart defect Father         Heart blockage    Heart defect Mother     Glaucoma Mother    [facility] Elevated Lipids Mother     Arthritis-osteo Brother     Kidney Disease Maternal Grandmother     Cancer Maternal Grandfather Lung cancer    Heart Attack Paternal Grandmother     Heart Attack Paternal Grandfather     Pancreatic Cancer Paternal Grandfather          REVIEW OF SYSTEMS  Constitutional symptoms: Negative  Eyes: Negative  Ears, Nose, Throat, and Mouth: Negative  Cardiovascular: Negative  Respiratory: Negative  Genitourinary: Negative  Integumentary (Skin and/or breast): Negative  Musculoskeletal: Positive for low back and buttocks pain. Extremities: Negative for edema. Endocrine/Rheumatologic: Negative  Hematologic/Lymphatic: Negative  Allergic/Immunologic: Negative  Psychiatric: Negative       PHYSICAL EXAMINATION  Visit Vitals  /73 (BP 1 Location: Right arm, BP Patient Position: Sitting)   Pulse 69   Temp 97.8 °F (36.6 °C) (Temporal)   Ht 5' 4\" (1.626 m)   Wt 174 lb 6.4 oz (79.1 kg)   LMP 09/11/2018 (Exact Date)   SpO2 98%   BMI 29.94 kg/m²       CONSTITUTIONAL: NAD, A&O x 3  HEART: Regular rate and rhythm  GASTROINTESTINAL: Positive bowel sounds, soft, nontender, and nondistended  LUNGS: Clear to auscultation bilaterally. SKIN: Negative for rash. RANGE OF MOTION: The patient has full passive range of motion in all four extremities. SENSATION: Sensation is intact to light touch throughout. MOTOR:   Straight Leg Raise: Negative, bilateral  Wheeler: Negative, bilateral  Deep tendon reflexes are 0 at the biceps, triceps, and brachioradialis bilaterally. Deep tendon reflexes are 0 at the knees and 1 at the ankles bilaterally. No increased tenderness to direct palpation of bilateral SI joints. Shoulder AB/Flex Elbow Flex Wrist Ext Elbow Ext Wrist Flex Hand Intrin Tone   Right +4/5 +4/5 +4/5 +4/5 +4/5 +4/5 +4/5   Left +4/5 +4/5 +4/5 +4/5 +4/5 +4/5 +4/5              Hip Flex Knee Ext Knee Flex Ankle DF GTE Ankle PF Tone   Right +4/5 +4/5 +4/5 +4/5 +4/5 +4/5 +4/5   Left +4/5 +4/5 +4/5 +4/5 +4/5 +4/5 +4/5       ASSESSMENT   Diagnoses and all orders for this visit:    1.  Lumbosacral spondylosis without myelopathy  -     methylPREDNISolone (MEDROL DOSEPACK) 4 mg tablet; Per dose pack instructions  -     REFERRAL TO PHYSICAL THERAPY    2. Lumbar neuritis  -     methylPREDNISolone (MEDROL DOSEPACK) 4 mg tablet; Per dose pack instructions  -     REFERRAL TO PHYSICAL THERAPY    3. DDD (degenerative disc disease), lumbar  -     methylPREDNISolone (MEDROL DOSEPACK) 4 mg tablet; Per dose pack instructions  -     REFERRAL TO PHYSICAL THERAPY    4. Sacroiliitis (HCC)  -     methylPREDNISolone (MEDROL DOSEPACK) 4 mg tablet; Per dose pack instructions  -     REFERRAL TO PHYSICAL THERAPY         IMPRESSIONS/RECOMMENDATIONS:  Patient presents today with c/o  low back and buttocks pain radiating into the BLE (RLE>LLE) in a L4 distribution to the knee in the RLE and mid-thigh in the LLE. Multiple treatment options were discussed. I will start her on Medrol Dosepak followed by OTC Ibuprofen. I will refer her to physical therapy with an emphasis on HEP. Patient is neurologically intact. I will see the patient back in 6 week's time or earlier if needed. Written by Jaelyn Pichardo, as dictated by Vu Dennis MD  I examined the patient, reviewed and agree with the note.

## 2021-06-24 ENCOUNTER — OFFICE VISIT (OUTPATIENT)
Dept: ORTHOPEDIC SURGERY | Age: 46
End: 2021-06-24
Payer: COMMERCIAL

## 2021-06-24 VITALS
HEIGHT: 64 IN | HEART RATE: 69 BPM | WEIGHT: 174.4 LBS | TEMPERATURE: 97.8 F | DIASTOLIC BLOOD PRESSURE: 73 MMHG | OXYGEN SATURATION: 98 % | SYSTOLIC BLOOD PRESSURE: 116 MMHG | BODY MASS INDEX: 29.78 KG/M2

## 2021-06-24 DIAGNOSIS — M54.16 LUMBAR NEURITIS: ICD-10-CM

## 2021-06-24 DIAGNOSIS — M46.1 SACROILIITIS (HCC): ICD-10-CM

## 2021-06-24 DIAGNOSIS — M47.817 LUMBOSACRAL SPONDYLOSIS WITHOUT MYELOPATHY: Primary | ICD-10-CM

## 2021-06-24 DIAGNOSIS — M51.36 DDD (DEGENERATIVE DISC DISEASE), LUMBAR: ICD-10-CM

## 2021-06-24 PROCEDURE — 99204 OFFICE O/P NEW MOD 45 MIN: CPT | Performed by: PHYSICAL MEDICINE & REHABILITATION

## 2021-06-24 RX ORDER — IBUPROFEN 200 MG
400 TABLET ORAL AS NEEDED
COMMUNITY

## 2021-06-24 RX ORDER — FEXOFENADINE HYDROCHLORIDE AND PSEUDOEPHEDRINE HYDROCHLORIDE 180; 240 MG/1; MG/1
1 TABLET, FILM COATED, EXTENDED RELEASE ORAL DAILY
COMMUNITY

## 2021-06-24 RX ORDER — METHYLPREDNISOLONE 4 MG/1
TABLET ORAL
Qty: 1 DOSE PACK | Refills: 0 | Status: SHIPPED | OUTPATIENT
Start: 2021-06-24

## 2021-06-24 NOTE — LETTER
6/24/2021    Patient: Howard West   YOB: 1975   Date of Visit: 6/24/2021     Kathleen Edgar, [address]  [facility] [address]  [address] [place] [address]  Via In Basket    Dear Kathleen Edgar MD,      Thank you for referring Ms. Laisha Lopez to [address] [facility] for evaluation. My notes for this consultation are attached. If you have questions, please do not hesitate to call me. I look forward to following your patient along with you.       Sincerely,    Galo Soliman MD

## 2021-07-02 ENCOUNTER — HOSPITAL ENCOUNTER (OUTPATIENT)
Dept: PHYSICAL THERAPY | Age: 46
Discharge: HOME | End: 2021-07-02
Attending: PHYSICAL MEDICINE & REHABILITATION
Payer: COMMERCIAL

## 2021-07-02 DIAGNOSIS — M47.817 LUMBOSACRAL SPONDYLOSIS WITHOUT MYELOPATHY: ICD-10-CM

## 2021-07-02 DIAGNOSIS — M54.16 LUMBAR NEURITIS: ICD-10-CM

## 2021-07-02 DIAGNOSIS — M46.1 SACROILIITIS (HCC): ICD-10-CM

## 2021-07-02 DIAGNOSIS — M51.36 DDD (DEGENERATIVE DISC DISEASE), LUMBAR: ICD-10-CM

## 2021-07-02 PROCEDURE — 97162 PT EVAL MOD COMPLEX 30 MIN: CPT

## 2021-07-02 PROCEDURE — 97530 THERAPEUTIC ACTIVITIES: CPT

## 2021-07-02 PROCEDURE — 97110 THERAPEUTIC EXERCISES: CPT

## 2021-07-02 NOTE — PROGRESS NOTES
PT DAILY TREATMENT NOTE     Patient Name: Edson Cornelius  Date:2021  : 1975  [x]  Patient  Verified  Payor: BLUE CROSS / Plan: [address] [place] [address] [place] / Product Type: PPO /    In time:258  Out time:343  Total Treatment Time (min): 45  Visit #: 1 of 8    Medicare/BCBS Only   Total Timed Codes (min):  25 1:1 Treatment Time:  39       Treatment Area: Lumbosacral spondylosis without myelopathy [M47.817]  Lumbar neuritis [M54.16]  DDD (degenerative disc disease), lumbar [M51.36]  Sacroiliitis (HCC) [M46.1]    SUBJECTIVE  Pain Level (0-10 scale): 6  Any medication changes, allergies to medications, adverse drug reactions, diagnosis change, or new procedure performed?: [x] No    [] Yes (see summary sheet for update)  Subjective functional status/changes:   [] No changes reported  Patient reports chronic LBP for years, however exacerbation noted over the last year with inactivity. She reports aching, sharp/spasms, and intermittent pain to the right groin and into anterior thigh stopping at her knee reporting sensation occuring ~1x/month. She reports any prolonged positioning causes increased pain. Patient reporting decreased pain with stretching and ice/heat temporarily. OBJECTIVE    20 min [x]Eval                  []Re-Eval       15 min Therapeutic Exercise:  [x] See flow sheet: Patient provided printed HEP to begin addressing impairments. Patient provided demonstration of exercises and rationale for each exercise to improve compliance to HEP. Education provided on importance of compliance to HEP for improved carry over between therapy session. Rationale: increase ROM, increase strength and improve coordination to improve the patients ability to perform ADLs and self care tasks with greater ease     10 min Therapeutic Activity:  [x]  See flow sheet : Patient education provided on pathology, findings, and treatment plan of care. Patient education provided on use of modalities as needed for relief. Rationale: improve coordination  to improve the patients ability to perform functional mobility with greater ease            With   [] TE   [] TA   [] neuro   [] other: Patient Education: [x] Review HEP    [] Progressed/Changed HEP based on:   [] positioning   [] body mechanics   [] transfers   [] heat/ice application    [] other:      Other Objective/Functional Measures: see paper chart for evaluation form     Pain Level (0-10 scale) post treatment: 6 \"sore\"    ASSESSMENT/Changes in Function: Patient is a 51year old female presenting with chronic LBP that has been ongoing for years, however she reports an exacerbation in symptoms ~1 year ago when she was less active. She reports no significant limitations prior to 1 year ago with recreational, household, or work activities. Patient currently limited with lifting, transfers, sleep tolerance, and tolerance to daily activities. She demonstrates good trunk ROM, decreased mobility through lumbar spine, decreased hip flexion and extension strength, decreased core stabilization, mild decrease in flexibility through bilateral LEs. Patient with significant tenderness to bilateral paraspinals, QL, glutes, and piriformis. Patient reporting increased low back pain with hip extension exercises demonstrating with significant lordosis noted and poor core activation. Patient would benefit from skilled PT 2x/week for 4 weeks to address the above impairments and promote return to PLOF and improve ease with recreational activities.      Patient will continue to benefit from skilled PT services to modify and progress therapeutic interventions, address functional mobility deficits, address ROM deficits, address strength deficits, analyze and address soft tissue restrictions, analyze and cue movement patterns, analyze and modify body mechanics/ergonomics, assess and modify postural abnormalities, address imbalance/dizziness and instruct in home and community integration to attain remaining goals. [x]  See Plan of Care  []  See progress note/recertification  []  See Discharge Summary         Progress towards goals / Updated goals:  Short Term Goals: To be accomplished in 2 weeks:  1. Patient will report performance of home exercise program 4 of 7 days in the next week demonstrating compliance to therapy program and progress towards independent management of symptoms. Eval: HEP provided     Long Term Goals: To be accomplished in 4 weeks:  1. Patient will report at least 75% improvement in overall symptoms to promote improved quality of life. Eval: 0%  2. Patient will increase bilateral hip flexion and extension strength to 5/5 to improve transfers and performance of recreational activities with greater ease. Eval: bilateral hip flexion 4+/5, right hip extension 4-/5, left hip extension 4/5  3. Patient will report at least 50% improvement in sleep to improve overall quality of life. Eval: waking up ~1x/night due to pain   4. Patient will perform standing hip extension x10 with good core activation and no increased pain reported in low back demonstrating improved core stabilization to improve ease with return to gym. Eval: 1x/hip extension resulting in increased LBP  5. Patient will improve FOTO survey score to 64 to improve ease with household and daily activities with greater ease.    Eval: 44     PLAN  [x]  Upgrade activities as tolerated     []  Continue plan of care  []  Update interventions per flow sheet       []  Discharge due to:_  []  Other:_      Mary Blanca PT, DPT 7/2/2021  3:48 PM    Future Appointments   Date Time Provider [place]   7/30/2021 10:30 AM Kelsea Chaves MD [facility] AMB   8/5/2021  9:20 AM Monica Shi MD [facility] BS AMB

## 2021-07-02 NOTE — PROGRESS NOTES
[facility]  [address]  [place], [address]  [phone] fax    Plan of Care/ Statement of Necessity for Physical Therapy Services    Patient name: Gabrielle Larry Start of Care: 2021   Referral source: Santiago Gutierres MD : 1975    Medical Diagnosis: Lumbosacral spondylosis without myelopathy [M47.817]  Lumbar neuritis [M54.16]  DDD (degenerative disc disease), lumbar [M51.36]  Sacroiliitis ([address] [M46.1]  Payor: BLUE CROSS / Plan:  [facility] / Product Type: PPO /  Onset Date: chronic, worsening over last year     Treatment Diagnosis: low back pain   Prior Hospitalization: see medical history Provider#: [id number]   Medications: Verified on Patient summary List    Comorbidities: allergies, back pain, BMI >30   Prior Level of Function: no limitations, going to the gym 3x/week      The Plan of Care and following information is based on the information from the initial evaluation. Assessment/ key information: Patient is a 51year old female presenting with chronic LBP that has been ongoing for years, however she reports an exacerbation in symptoms ~1 year ago when she was less active. She reports no significant limitations prior to 1 year ago with recreational, household, or work activities. Patient currently limited with lifting, transfers, sleep tolerance, and tolerance to daily activities. She demonstrates good trunk ROM, decreased mobility through lumbar spine, decreased hip flexion and extension strength, decreased core stabilization, mild decrease in flexibility through bilateral LEs. Patient with significant tenderness to bilateral paraspinals, QL, glutes, and piriformis. Patient reporting increased low back pain with hip extension exercises demonstrating with significant lordosis noted and poor core activation.  Patient would benefit from skilled PT 2x/week for 4 weeks to address the above impairments and promote return to PLOF and improve ease with recreational activities. Evaluation Complexity History HIGH Complexity :3+ comorbidities / personal factors will impact the outcome/ POC ; Examination MEDIUM Complexity : 3 Standardized tests and measures addressing body structure, function, activity limitation and / or participation in recreation  ;Presentation MEDIUM Complexity : Evolving with changing characteristics  ; Clinical Decision Making MEDIUM Complexity : FOTO score of 26-74  Overall Complexity Rating: MEDIUM  Problem List: pain affecting function, decrease ROM, decrease strength, impaired gait/ balance, decrease ADL/ functional abilitiies, decrease activity tolerance, decrease flexibility/ joint mobility and decrease transfer abilities   Treatment Plan may include any combination of the following: Therapeutic exercise, Therapeutic activities, Neuromuscular re-education, Physical agent/modality, Gait/balance training, Manual therapy, Patient education, Self Care training, Functional mobility training, Home safety training and Stair training  Patient / Family readiness to learn indicated by: asking questions, trying to perform skills and interest  Persons(s) to be included in education: patient (P)  Barriers to Learning/Limitations: None  Patient Goal (s): I want to be functional  Patient Self Reported Health Status: good  Rehabilitation Potential: good    Short Term Goals: To be accomplished in 2 weeks:  1. Patient will report performance of home exercise program 4 of 7 days in the next week demonstrating compliance to therapy program and progress towards independent management of symptoms. Long Term Goals: To be accomplished in 4 weeks:  1. Patient will report at least 75% improvement in overall symptoms to promote improved quality of life. 2. Patient will increase bilateral hip flexion and extension strength to 5/5 to improve transfers and performance of recreational activities with greater ease.    3. Patient will report at least 50% improvement in sleep to improve overall quality of life. 4. Patient will perform standing hip extension x10 with good core activation and no increased pain reported in low back demonstrating improved core stabilization to improve ease with return to gym. 5. Patient will improve FOTO survey score to 64 to improve ease with household and daily activities with greater ease. Frequency / Duration: Patient to be seen 2 times per week for 4 weeks. Patient/ Caregiver education and instruction: Diagnosis, prognosis, activity modification, exercises and other modalities application as needed   [x]  Plan of care has been reviewed with MARINE Montalvo PT, DPT 7/2/2021 3:49 PM    ________________________________________________________________________    I certify that the above Therapy Services are being furnished while the patient is under my care. I agree with the treatment plan and certify that this therapy is necessary.     [de-identified] Signature:____________Date:_________TIME:________     Tiffanie Deo MD  ** Signature, Date and Time must be completed for valid certification **    Please sign and return to [facility] [address]  [place], [address]  [phone] fax

## 2021-07-07 ENCOUNTER — HOSPITAL ENCOUNTER (OUTPATIENT)
Dept: PHYSICAL THERAPY | Age: 46
Discharge: HOME | End: 2021-07-07
Attending: PHYSICAL MEDICINE & REHABILITATION
Payer: COMMERCIAL

## 2021-07-07 PROCEDURE — 97112 NEUROMUSCULAR REEDUCATION: CPT

## 2021-07-07 PROCEDURE — 97110 THERAPEUTIC EXERCISES: CPT

## 2021-07-07 NOTE — PROGRESS NOTES
PT DAILY TREATMENT NOTE     Patient Name: Pablo Perea  Date:2021  : 1975  [x]  Patient  Verified  Payor: BLUE CROSS / Plan: [employer] [facility] / Product Type: PPO /    In time: 2:15 PM  Out time: 2:58 PM  Total Treatment Time (min): 43  Visit #: 1 of 8    Medicare/BCBS Only   Total Timed Codes (min):  43 1:1 Treatment Time:  43       Treatment Area: No admission diagnoses are documented for this encounter. SUBJECTIVE  Pain Level (0-10 scale): 5  Any medication changes, allergies to medications, adverse drug reactions, diagnosis change, or new procedure performed?: [x] No    [] Yes (see summary sheet for update)  Subjective functional status/changes:   [] No changes reported  Sore from having just gotten off of work. Has been completing HEP and seeing benefit. Requiring less Ibuprofren for pain management. OBJECTIVE      18 min Therapeutic Exercise:  [x] See flow sheet :   Rationale: increase ROM and increase strength to improve the patients ability to complete household chores with greater ease. 25 min Neuromuscular Re-education:  [x]  See flow sheet :   Rationale: increase strength and improve coordination  to improve the patients ability to perform work tasks with greater ease. With   [x] TE   [] TA   [x] neuro   [] other: Patient Education: [] Review HEP    [] Progressed/Changed HEP based on:   [] positioning   [x] body mechanics   [] transfers   [] heat/ice application    [x] other: lumbopelvic mechanics/TA recruitment with functional activities, importance of core stabilization in lumbar health     Other Objective/Functional Measures: Exercise program initiated per flowsheet.      Pain Level (0-10 scale) post treatment: 5    ASSESSMENT/Changes in Function: Patient with good understanding and demonstration of     Patient will continue to benefit from skilled PT services to modify and progress therapeutic interventions, address functional mobility deficits, address ROM deficits, address strength deficits, analyze and address soft tissue restrictions and analyze and cue movement patterns to attain remaining goals. Progress towards goals / Updated goals:  Short Term Goals: To be accomplished in 2 weeks:  1. Patient will report performance of home exercise program 4 of 7 days in the next week demonstrating compliance to therapy program and progress towards independent management of symptoms. Eval: HEP provided    Current: 7/7/2021 - MET - completing daily with therapeutic benefit     Long Term Goals: To be accomplished in 4 weeks:  1. Patient will report at least 75% improvement in overall symptoms to promote improved quality of life. Eval: 0%  2. Patient will increase bilateral hip flexion and extension strength to 5/5 to improve transfers and performance of recreational activities with greater ease. Eval: bilateral hip flexion 4+/5, right hip extension 4-/5, left hip extension 4/5  3. Patient will report at least 50% improvement in sleep to improve overall quality of life. Eval: waking up ~1x/night due to pain   4. Patient will perform standing hip extension x10 with good core activation and no increased pain reported in low back demonstrating improved core stabilization to improve ease with return to gym. Eval: 1x/hip extension resulting in increased LBP  5. Patient will improve FOTO survey score to 64 to improve ease with household and daily activities with greater ease.               Eval: 44        PLAN  []  Upgrade activities as tolerated     []  Continue plan of care  []  Update interventions per flow sheet       []  Discharge due to:_  []  Other:_      Gino Anderson, PT 7/7/2021  2:16 PM    Future Appointments   Date Time Provider Benito Hill   7/14/2021  1:30 PM Aron Thomas, ANDREW [facility]   7/16/2021  8:30 AM Nakul Haji PTA [facility]   7/26/2021 10:45 AM ANDREW [facility]   7/29/2021 10:00 AM Candi Calles [facility]   7/30/2021 10:30 AM Mary Chau MD [facility] BS AMB   8/5/2021  9:20 AM Ja Guevara MD [facility] BS AMB

## 2021-07-14 ENCOUNTER — HOSPITAL ENCOUNTER (OUTPATIENT)
Dept: PHYSICAL THERAPY | Age: 46
Discharge: HOME | End: 2021-07-14
Attending: PHYSICAL MEDICINE & REHABILITATION
Payer: COMMERCIAL

## 2021-07-14 PROCEDURE — 97112 NEUROMUSCULAR REEDUCATION: CPT

## 2021-07-14 PROCEDURE — 97110 THERAPEUTIC EXERCISES: CPT

## 2021-07-14 PROCEDURE — 97140 MANUAL THERAPY 1/> REGIONS: CPT

## 2021-07-14 NOTE — PROGRESS NOTES
PT DAILY TREATMENT NOTE     Patient Name: Miladys Presume  Date:2021  : 1975  [x]  Patient  Verified  Payor: BLUE CROSS / Plan: [employer] [facility] / Product Type: PPO /    In time:135PM  Out time:217PM  Total Treatment Time (min): 42  Visit #: 3 of 8    Medicare/BCBS Only   Total Timed Codes (min):  42 1:1 Treatment Time:  38       Treatment Area: No admission diagnoses are documented for this encounter. SUBJECTIVE  Pain Level (0-10 scale): 4-5 with meds  Any medication changes, allergies to medications, adverse drug reactions, diagnosis change, or new procedure performed?: [x] No    [] Yes (see summary sheet for update)  Subjective functional status/changes:   [] No changes reported  Reports has been exhausted from a vacation so has been sleeping well and has had some pain reduction. OBJECTIVE    15 min Therapeutic Exercise:  [x] See flow sheet :   Rationale: increase ROM and increase strength to improve the patients ability to complete household chores with greater ease. 15 min Neuromuscular Re-education:  [x]  See flow sheet :   Rationale: increase strength and improve coordination  to improve the patients ability to perform work tasks with greater ease. 8 min Manual Therapy:  Pt prone -- GR 2-3 lumbar and sacral PA oscillations. STM to B lumbar paraspinals. The manual therapy interventions were performed at a separate and distinct time from the therapeutic activities interventions. Rationale: decrease pain, increase ROM and increase tissue extensibility to maximize ease of self care. With   [] TE   [] TA   [] neuro   [] other: Patient Education: [x] Review HEP    [] Progressed/Changed HEP based on:   [] positioning   [] body mechanics   [] transfers   [] heat/ice application    [] other:      Other Objective/Functional Measures: added core align hip abd and ext with TA bracing.       Pain Level (0-10 scale) post treatment: 3    ASSESSMENT/Changes in Function: Pt introduced to new exercises today included the core align hip abduction and extensions, performed without pain c/o. Emphasized PPT with standing and supine exercises with TA draw in bracing to promote core activation. Good reports of reduced pain following session today. Pt has good lumbar mobility with manual today, may not need lumbar PA's moving forward. Patient will continue to benefit from skilled PT services to modify and progress therapeutic interventions, address functional mobility deficits, address ROM deficits, address strength deficits, analyze and address soft tissue restrictions, analyze and cue movement patterns, analyze and modify body mechanics/ergonomics, assess and modify postural abnormalities, address imbalance/dizziness and instruct in home and community integration to attain remaining goals. []  See Plan of Care  []  See progress note/recertification  []  See Discharge Summary         Progress towards goals / Updated goals:  Short Term Goals: To be accomplished in 2 weeks:  1. Patient will report performance of home exercise program 4 of 7 days in the next week demonstrating compliance to therapy program and progress towards independent management of symptoms.             Eval: HEP provided               Current: 7/7/2021 - MET - completing daily with therapeutic benefit     Long Term Goals: To be accomplished in 4 weeks:  1. Patient will report at least 75% improvement in overall symptoms to promote improved quality of life.               Eval: 0%  2. Patient will increase bilateral hip flexion and extension strength to 5/5 to improve transfers and performance of recreational activities with greater ease.               Eval: bilateral hip flexion 4+/5, right hip extension 4-/5, left hip extension 4/5  3.  Patient will report at least 50% improvement in sleep to improve overall quality of life.              Eval: waking up ~1x/night due to pain    Current: progressing, reports reduced waking from pain and improved sleep with reduced pain. (7/14/2021)  4. Patient will perform standing hip extension x10 with good core activation and no increased pain reported in low back demonstrating improved core stabilization to improve ease with return to gym.               Eval: 1x/hip extension resulting in increased LBP  5.  Patient will improve FOTO survey score to 64 to improve ease with household and daily activities with greater ease.              Eval: 44     PLAN  []  Upgrade activities as tolerated     [x]  Continue plan of care  []  Update interventions per flow sheet       []  Discharge due to:_  []  Other:_      Prasad Huber, PT 7/14/2021  1:36 PM    Future Appointments   Date Time Provider Benito Aguilloni   7/16/2021  8:30 AM Chaya Diego PTA [facility]   7/26/2021 10:45 AM Gamal Cordoba, PT [facility]   7/29/2021 10:00 AM Og Durbin [facility]   7/30/2021 10:30 AM Anjali Barney MD [facility] BS AMB   8/5/2021  9:20 AM Florencio Myles MD [facility] BS AMB

## 2021-07-16 ENCOUNTER — HOSPITAL ENCOUNTER (OUTPATIENT)
Dept: PHYSICAL THERAPY | Age: 46
Discharge: HOME | End: 2021-07-16
Attending: PHYSICAL MEDICINE & REHABILITATION
Payer: COMMERCIAL

## 2021-07-16 PROCEDURE — 97140 MANUAL THERAPY 1/> REGIONS: CPT

## 2021-07-16 PROCEDURE — 97110 THERAPEUTIC EXERCISES: CPT

## 2021-07-16 PROCEDURE — 97112 NEUROMUSCULAR REEDUCATION: CPT

## 2021-07-16 NOTE — PROGRESS NOTES
PT DAILY TREATMENT NOTE     Patient Name: Noreen Dumont  Date:2021  : 1975  [x]  Patient  Verified  Payor: BLUE CROSS / Plan: [employer] [facility] / Product Type: PPO /    In time:8:35  Out time:9:22  Total Treatment Time (min): 47  Visit #: 4 of 8    Medicare/BCBS Only   Total Timed Codes (min):  47 1:1 Treatment Time:  52       Treatment Area: Spondylosis without myelopathy or radiculopathy, lumbosacral region [M47.817]    SUBJECTIVE  Pain Level (0-10 scale): 3  Any medication changes, allergies to medications, adverse drug reactions, diagnosis change, or new procedure performed?: [x] No    [] Yes (see summary sheet for update)  Subjective functional status/changes:   [] No changes reported  Pt states she is definitely getting better, she is able to get through her day without having to sit because of pain    OBJECTIVE    29 min Therapeutic Exercise:  [x] See flow sheet :   Rationale: increase ROM and increase strength to improve the patients ability to perform ADLs    10 min Neuromuscular Re-education:  [x]  See flow sheet :   Rationale: increase strength, improve coordination and increase proprioception  to improve the patients ability to perform functional tasks    8 min Manual Therapy:  L/s PA mobs, DTM to nancy Lumbar paraspinals, nancy piri release   The manual therapy interventions were performed at a separate and distinct time from the therapeutic activities interventions. Rationale: decrease pain, increase ROM and increase tissue extensibility to improve functional mobility            With   [] TE   [] TA   [] neuro   [] other: Patient Education: [x] Review HEP    [] Progressed/Changed HEP based on:   [] positioning   [] body mechanics   [] transfers   [] heat/ice application    [] other:      Other Objective/Functional Measures:      Pain Level (0-10 scale) post treatment: 1-2    ASSESSMENT/Changes in Function: Vc's to maintain TA activation in quad. Notes ms fatigue with clam x 3. CYNTHIA @ nancy Wong pain and improved gati quality following treatment today. Patient will continue to benefit from skilled PT services to modify and progress therapeutic interventions, address functional mobility deficits, address ROM deficits, address strength deficits, analyze and address soft tissue restrictions, analyze and cue movement patterns, analyze and modify body mechanics/ergonomics and assess and modify postural abnormalities to attain remaining goals. []  See Plan of Care  []  See progress note/recertification  []  See Discharge Summary         Progress towards goals / Updated goals:  Short Term Goals: To be accomplished in 2 weeks:  1. Patient will report performance of home exercise program 4 of 7 days in the next week demonstrating compliance to therapy program and progress towards independent management of symptoms.             Eval: HEP provided               UVPYURU: 7/7/2021 - MET - completing daily with therapeutic benefit     Long Term Goals: To be accomplished in 4 weeks:  1. Patient will report at least 75% improvement in overall symptoms to promote improved quality of life.               Eval: 0%  2. Patient will increase bilateral hip flexion and extension strength to 5/5 to improve transfers and performance of recreational activities with greater ease.               Eval: bilateral hip flexion 4+/5, right hip extension 4-/5, left hip extension 4/5  3. Patient will report at least 50% improvement in sleep to improve overall quality of life.              Eval: waking up ~1x/night due to pain               Current: progressing, reports reduced waking from pain and improved sleep with reduced pain. (7/14/2021)  4. Patient will perform standing hip extension x10 with good core activation and no increased pain reported in low back demonstrating improved core stabilization to improve ease with return to gym.               Eval: 1x/hip extension resulting in increased LBP  5.  Patient will improve FOTO survey score to 64 to improve ease with household and daily activities with greater ease.              Eval: 44     PLAN  []  Upgrade activities as tolerated     [x]  Continue plan of care  []  Update interventions per flow sheet       []  Discharge due to:_  []  Other:_      Julianne Mock, PTA 7/16/2021  8:44 AM    Future Appointments   Date Time Provider Ben[place] Hill   7/26/2021 10:45 AM Donovan Avendaño PT [facility]   7/29/2021 10:00 [facility]   7/30/2021 10:30 AM Tiffani Nicole MD [facility] BS AMB   8/5/2021  9:20 AM Amarilis Goldman MD [facility] BS AMB

## 2021-07-26 ENCOUNTER — HOSPITAL ENCOUNTER (OUTPATIENT)
Dept: PHYSICAL THERAPY | Age: 46
Discharge: HOME | End: 2021-07-26
Attending: PHYSICAL MEDICINE & REHABILITATION
Payer: COMMERCIAL

## 2021-07-26 PROCEDURE — 97112 NEUROMUSCULAR REEDUCATION: CPT

## 2021-07-26 PROCEDURE — 97140 MANUAL THERAPY 1/> REGIONS: CPT

## 2021-07-26 PROCEDURE — 97110 THERAPEUTIC EXERCISES: CPT

## 2021-07-26 NOTE — PROGRESS NOTES
PT DAILY TREATMENT NOTE     Patient Name: Verlon Holter  Date:2021  : 1975  [x]  Patient  Verified  Payor: BLUE CROSS / Plan: [facility] / Product Type: PPO /    In time:1046AM  Out time:1138AM  Total Treatment Time (min): 52  Visit #: 5 of 8    Medicare/BCBS Only   Total Timed Codes (min):  52 1:1 Treatment Time:  47       Treatment Area: Spondylosis without myelopathy or radiculopathy, lumbosacral region [M47.817]    SUBJECTIVE  Pain Level (0-10 scale): 2  Any medication changes, allergies to medications, adverse drug reactions, diagnosis change, or new procedure performed?: [x] No    [] Yes (see summary sheet for update)  Subjective functional status/changes:   [] No changes reported  Just got back from vacation and had done a lot of walking with hills, tubing, and then drove ten hours back yesterday and is surprised that her pain is lower. Discussing potential discharge or continuation, pt states she may try to transition to yoga/pilates at her gym and eventually see if she could resume \"high impact training. \"    OBJECTIVE    22 min Therapeutic Exercise:  [x] See flow sheet :   Rationale: increase ROM and increase strength to improve the patients ability to complete household chores with greater ease.       15 min Neuromuscular Re-education:  [x]  See flow sheet :   Rationale: increase strength and improve coordination  to improve the patients ability to perform work tasks with greater ease.     10 min Manual Therapy:  Pt prone -- brief lumbar PA's to assess mobility. DTM to the B glutes and piriformis with fingers to assess and \"meat hook\" tool to perform massage. The manual therapy interventions were performed at a separate and distinct time from the therapeutic activities interventions. Rationale: decrease pain, increase ROM and increase tissue extensibility to maximize ease of self care.            With   [] TE   [] TA   [] neuro   [] other: Patient Education: [x] Review HEP    [] Progressed/Changed HEP based on:   [] positioning   [] body mechanics   [] transfers   [] heat/ice application    [] other:      Other Objective/Functional Measures: see progress towards goals. Pain Level (0-10 scale) post treatment: 0    ASSESSMENT/Changes in Function: Pt continues to demonstrate some functional core weakness with activities unless consciously merna her abdominals when doing tasks. She is leaning towards discharge at her next visit and hoping to reintegrate into her [facility] to self manage her symptoms. She is advised to be mindful of \"high impact\" activities that could cause increased pain if her core is still weak and encouraged to pursue pilates to work on her core. Patient will continue to benefit from skilled PT services to modify and progress therapeutic interventions, address functional mobility deficits, address ROM deficits, address strength deficits, analyze and address soft tissue restrictions, analyze and cue movement patterns, analyze and modify body mechanics/ergonomics, assess and modify postural abnormalities, address imbalance/dizziness and instruct in home and community integration to attain remaining goals. []  See Plan of Care  []  See progress note/recertification  []  See Discharge Summary         Progress towards goals / Updated goals:  Short Term Goals: To be accomplished in 2 weeks:  1. Patient will report performance of home exercise program 4 of 7 days in the next week demonstrating compliance to therapy program and progress towards independent management of symptoms.             Eval: HEP provided               HORTENSIA: 7/7/2021 - MET - completing daily with therapeutic benefit     Long Term Goals: To be accomplished in 4 weeks:  1. Patient will report at least 75% improvement in overall symptoms to promote improved quality of life.               Eval: 0%  2.  Patient will increase bilateral hip flexion and extension strength to 5/5 to improve transfers and performance of recreational activities with greater ease.               Eval: bilateral hip flexion 4+/5, right hip extension 4-/5, left hip extension 4/5  3. Patient will report at least 50% improvement in sleep to improve overall quality of life.              Eval: waking up ~1x/night due to pain               Current: progressing, reports reduced waking from pain and improved sleep with reduced pain. (7/14/2021)  4. Patient will perform standing hip extension x10 with good core activation and no increased pain reported in low back demonstrating improved core stabilization to improve ease with return to gym.               Eval: 1x/hip extension resulting in increased LBP   Current: progressing, 10x B, initially some \"increased tension not pain. \" and when cued to exaggerate abdominal bracing this sensation no longer present. (7/26/2021)  5.  Patient will improve FOTO survey score to 64 to improve ease with household and daily activities with greater ease.              Eval: 44     PLAN  []  Upgrade activities as tolerated     []  Continue plan of care  []  Update interventions per flow sheet       []  Discharge due to:_  []  Other:_      Ronnie Bran, PT 7/26/2021  10:53 AM    Future Appointments   Date Time Provider Ben[place]   7/29/2021 10:00 AM Humaira Borjas [facility]   7/30/2021 10:30 AM Keon Mccormack MD [facility]   8/5/2021  9:20 AM Chau Shi MD [facility]

## 2021-07-29 ENCOUNTER — HOSPITAL ENCOUNTER (OUTPATIENT)
Dept: PHYSICAL THERAPY | Age: 46
Discharge: HOME | End: 2021-07-29
Attending: PHYSICAL MEDICINE & REHABILITATION
Payer: COMMERCIAL

## 2021-07-29 PROCEDURE — 97110 THERAPEUTIC EXERCISES: CPT

## 2021-07-29 PROCEDURE — 97112 NEUROMUSCULAR REEDUCATION: CPT

## 2021-07-29 NOTE — PROGRESS NOTES
[facility]  [address]  [place], [address]  [phone] fax    Physical Therapy Discharge Summary  Patient name: Susu Mccall Start of Care: 2021   Referral source: Dora Simeon MD : 1975   Medical/Treatment Diagnosis: Spondylosis without myelopathy or radiculopathy, lumbosacral region [M47.817]  Payor: BLUE CROSS / Plan: [facility] / Product Type: PPO /  Onset Date: chronic, worsening over last year     Prior Hospitalization: see medical history Provider#: [id number]   Medications: Verified on Patient Summary List     Comorbidities: allergies, back pain, BMI >30   Prior Level of Function: no limitations, going to the gym 3x/week   Visits from Start of Care: 6    Missed Visits: 0  Reporting Period : 2021 to 2021      Summary of Care:  Short Term Goals: To be accomplished in 2 weeks:  1. Patient will report performance of home exercise program 4 of 7 days in the next week demonstrating compliance to therapy program and progress towards independent management of symptoms.             Eval: HEP provided               VSJDYMQ: MET - completing daily with therapeutic benefit     Long Term Goals: To be accomplished in 4 weeks:  1. Patient will report at least 75% improvement in overall symptoms to promote improved quality of life.               Eval: 0%              Current: Met  - at least 85%  2. Patient will increase bilateral hip flexion and extension strength to 5/5 to improve transfers and performance of recreational activities with greater ease.               Eval: bilateral hip flexion 4+/5, right hip extension 4-/5, left hip extension 4/5              Current: Progressing - bilateral hip flexion 5/5, right hip extension 4+/5, left hip extension 5-/5  3.  Patient will report at least 50% improvement in sleep to improve overall quality of life.              Eval: waking up ~1x/night due to pain               NSOJVQK: progressing, reports reduced waking from pain and improved sleep with reduced pain. (7/14/2021), met 7/29/2021 - reports she is no longer waking up at night due to pain   4. Patient will perform standing hip extension x10 with good core activation and no increased pain reported in low back demonstrating improved core stabilization to improve ease with return to gym.               Eval: 1x/hip extension resulting in increased LBP              Current: progressing, 10x B, initially some \"increased tension not pain. \" and when cued to exaggerate abdominal bracing this sensation no longer present  5. Patient will improve FOTO survey score to 64 to improve ease with household and daily activities with greater ease.              Eval: 44               Current: Met - 75    ASSESSMENT/RECOMMENDATIONS: Patient presents for last visit under current plan of care. Patient reports ~85% improvement in overall symptoms since [facility]. Patient reports she is more aware of lifting mechanics, posturing, and core activation which has helped significantly with work duties and household activities. She demonstrates improvements in core stabilization, bilateral hip strength, and improved sleep tolerance. Patient demonstrates significant improvement in overall mobility as measured by FOTO survey score. Discussed with patient transition to yoga/pilates based program at discharge with graded exposure to higher impact classes as tolerated. Patient provided extensive HEP to transition to independent management with updated HEP. Patient provided appropriate therabands to challenge patient. Patient with no further questions or concerns at this time and is agreeable to discharge at this time.      [x]Discontinue therapy: [x]Patient has reached or is progressing toward set goals      []Patient is non-compliant or has abdicated      []Due to lack of appreciable progress towards set goals    Verner Rains, PT, DPT  7/29/2021 10:23 AM

## 2021-07-29 NOTE — PROGRESS NOTES
Physical Therapy Discharge Instructions      [facility]  [address]  "[place] [employer] [address]  [phone] fax    Patient: Yue Trevino  : 1975      Continue Home Exercise Program 1-2 times per day for 3 weeks, then decrease to 4-5 times per week      Continue with    [x] Ice  as needed 1-2 times per day     [x] Heat           Follow up with MD:     [] Upon completion of therapy     [x] As needed      Recommendations:     [x]   Return to activity with home program    []   Return to activity with the following modifications:       []Post Rehab Program    []Join Independent aquatic program     []Return to/join local gym        Additional Comments: Continue to focus on piriformis release, can use tennis ball. Discussed transition to pilates/yoga prior to initiating more impact activities. Recommend graded exposure to higher level activities to determine tolerance.        Remy Spring PT, DPT 2021 10:29 AM

## 2021-07-29 NOTE — PROGRESS NOTES
PT DAILY TREATMENT NOTE     Patient Name: Marilu Smith  Date:2021  : 1975  [x]  Patient  Verified  Payor: BLUE CROSS / Plan: [facility] [place] / Product Type: PPO /    In time: 1000  Out time: 1045  Total Treatment Time (min): 45  Visit #: 6 of 8    Medicare/BCBS Only   Total Timed Codes (min):  45 1:1 Treatment Time:  45       Treatment Area: Spondylosis without myelopathy or radiculopathy, lumbosacral region [M47.817]    SUBJECTIVE  Pain Level (0-10 scale): 2-3  Any medication changes, allergies to medications, adverse drug reactions, diagnosis change, or new procedure performed?: [x] No    [] Yes (see summary sheet for update)  Subjective functional status/changes:   [] No changes reported  Patient reports she has been doing really well and is more attentive to posture and core activation with daily activities. OBJECTIVE    33 min Therapeutic Exercise:  [x] See flow sheet : + reassessment of goals, review of updated HEP    Rationale: increase ROM, increase strength and improve coordination to improve the patients ability to perform ADLs and self care tasks with greater ease     12 min Neuromuscular Re-education:  [x]  See flow sheet : core align series, quadruped series, [place] series   Rationale: increase strength, improve coordination and increase proprioception  to improve the patients ability to perform functional mobility with greater stabilization and control     With   [] TE   [] TA   [] neuro   [] other: Patient Education: [x] Review HEP    [] Progressed/Changed HEP based on:   [] positioning   [] body mechanics   [] transfers   [] heat/ice application    [] other:      Other Objective/Functional Measures: bilateral hip flexion 5/5, right hip extension 4+/5, left hip extension 5-/5    Pain Level (0-10 scale) post treatment: 0    ASSESSMENT/Changes in Function: Patient presents for last visit under current plan of care.  Patient reports ~85% improvement in overall symptoms since [facility]. Patient reports she is more aware of lifting mechanics, posturing, and core activation which has helped significantly with work duties and household activities. She demonstrates improvements in core stabilization, bilateral hip strength, and improved sleep tolerance. Patient demonstrates significant improvement in overall mobility as measured by FOTO survey score. Discussed with patient transition to yoga/pilates based program at discharge with graded exposure to higher impact classes as tolerated. Patient provided extensive HEP to transition to independent management with updated HEP. Patient provided appropriate therabands to challenge patient. Patient with no further questions or concerns at this time and is agreeable to discharge at this time. []  See Plan of Care  []  See progress note/recertification  [x]  See Discharge Summary         Progress towards goals / Updated goals:  Short Term Goals: To be accomplished in 2 weeks:  1. Patient will report performance of home exercise program 4 of 7 days in the next week demonstrating compliance to therapy program and progress towards independent management of symptoms.             Eval: HEP provided               VWBUCUU: 7/7/2021 - MET - completing daily with therapeutic benefit     Long Term Goals: To be accomplished in 4 weeks:  1. Patient will report at least 75% improvement in overall symptoms to promote improved quality of life.               Eval: 0%   Current: Met 7/29/2021 - at least 85%  2. Patient will increase bilateral hip flexion and extension strength to 5/5 to improve transfers and performance of recreational activities with greater ease.               Eval: bilateral hip flexion 4+/5, right hip extension 4-/5, left hip extension 4/5   Current: Progressing 7/29/2021 - bilateral hip flexion 5/5, right hip extension 4+/5, left hip extension 5-/5  3.  Patient will report at least 50% improvement in sleep to improve overall quality of life.              Eval: waking up ~1x/night due to pain               Current: progressing, reports reduced waking from pain and improved sleep with reduced pain. (7/14/2021), met 7/29/2021 - reports she is no longer waking up at night due to pain   4. Patient will perform standing hip extension x10 with good core activation and no increased pain reported in low back demonstrating improved core stabilization to improve ease with return to gym.               Eval: 1x/hip extension resulting in increased LBP              Current: progressing, 10x B, initially some \"increased tension not pain. \" and when cued to exaggerate abdominal bracing this sensation no longer present. (7/26/2021)  5.  Patient will improve FOTO survey score to 64 to improve ease with household and daily activities with greater ease.              Eval: 44    Current: Met 7/29/2021 - 75    PLAN  []  Upgrade activities as tolerated     []  Continue plan of care  []  Update interventions per flow sheet       [x]  Discharge due to: progressing/met LTGs   []  Other:_      Bhanu Elizabeth, PT, DPT 7/29/2021  10:05 AM    Future Appointments   Date Time Provider [place] Hill   8/5/2021  9:20 AM Suzette Schilder, MD [facility]

## 2021-08-04 NOTE — PROGRESS NOTES
[facility]  [address], [address]   Phone: [phone]  Fax: [phone]        PROGRESS NOTE      HISTORY OF PRESENT ILLNESS:  The patient is a 51 y.o. female and was seen today for follow up of progressive low back and buttocks pain radiating into the BLE (RLE>LLE) in a L4 distribution to the knee in the RLE and mid-thigh in the LLE x a couple of years without trauma. Her pain is exacerbated by standing and sitting. She has treated with Motrin with minimal benefit. Patient denies previous spinal surgery, injections, or physical therapy/chiropractic care. Pt denies change in bowel or bladder habits. Pt denies fever, weight loss, or skin changes. The patient is RHD. PmHx of hysterectomy. Note from Aravind Younger MD dated 4/19/20211 indicating patient was seen with c/o chronic sacroiliitis, low back pain. A1c was 5.4 on 4/12/2021. Tested negative recently for inflammatory arthritis. Unremarkable SANDHYA and RF; CRP was unremarkable. Referred to spine. L spine XR dated 4/12/2021 films independently reviewed. Per report, mild multilevel degenerative changes. Unchanged appearance of the bilateral sacroiliac joints, with increased sclerosis about the left sacroiliac joint. This may reflect chronic sacroiliitis or stress-related change. The presence of vacuum phenomenon suggests a component of osteoarthritis. I independently reviewed the films and noted: mild disc space narrowing at L3-4, L4-5, and L5-1. At her last clinical appointment, I started her on Medrol Dosepak followed by OTC Ibuprofen. I referred her to physical therapy with an emphasis on HEP.         The patient returns today and reports pain location and distribution remains unchanged. She rates her pain 4-8/10, previously 8/10. She tolerated the Medrol Dosepak with minimal relief. She take ibuprofen once a day with some benefit. Therapy notes reviewed. Pt completed therapy with benefit.  She admits to being uncompliant with her HEP due to being on vacation. Pt denies change in bowel or bladder habits.  reviewed. Body mass index is 30.24 kg/m². PCP: Michael Hoover MD      History reviewed. No pertinent past medical history. Social History     Socioeconomic History    Marital status:      Spouse name: Not on file    Number of children: Not on file    Years of education: Not on file    Highest education level: Not on file   Occupational History    Not on file   Tobacco Use    Smoking status: Never Smoker    Smokeless tobacco: Never Used   Substance and Sexual Activity    Alcohol use: Yes     Alcohol/week: 3.0 standard drinks     Types: 3 Glasses of wine per week     Comment: occiassional     Drug use: No    Sexual activity: Yes     Partners: Male     Birth control/protection: None, Surgical     Comment: tubal ligation   Other Topics Concern    Not on file   Social History Narrative    Not on file     Social Determinants of Health     Financial Resource Strain:     Difficulty of Paying Living Expenses:    Food Insecurity:     Worried About Running Out of Food in the Last Year:     Ran Out of Food in the Last Year:    Transportation Needs:     Lack of Transportation (Medical):      Lack of Transportation (Non-Medical):    Physical Activity:     Days of Exercise per Week:     Minutes of Exercise per Session:    Stress:     Feeling of Stress :    Social Connections:     Frequency of Communication with Friends and Family:     Frequency of Social Gatherings with Friends and Family:     Attends Latter day Services:     Active Member of Clubs or Organizations:     Attends Club or Organization Meetings:     Marital Status:    Intimate Partner Violence:     Fear of Current or Ex-Partner:     Emotionally Abused:     Physically Abused:     Sexually Abused:        Current Outpatient Medications   Medication Sig Dispense Refill    fexofenadine-pseudoephedrine (Allegra-D 24 Hour) 180-240 mg per tablet Take 1 Tablet by mouth daily.  ibuprofen (MOTRIN) 200 mg tablet Take 400 mg by mouth as needed for Pain.  methylPREDNISolone (MEDROL DOSEPACK) 4 mg tablet Per dose pack instructions 1 Dose Pack 0    b complex vitamins tablet Take 1 Tab by mouth daily.  naproxen sodium (ALEVE) 220 mg cap Take 220 mg by mouth daily as needed.  Calcium-Cholecalciferol, D3, 500 mg(1,250mg) -400 unit tab Take  by mouth two (2) times a day.  KRILL OIL PO Take  by mouth daily.  B.infantis-B.ani-B.long-B.bifi (PROBIOTIC 4X) 10-15 mg TbEC Take  by mouth daily.  cetirizine (ZYRTEC) 10 mg tablet Take 1 Tab by mouth daily as needed for Other (eczema flare). (Patient not taking: Reported on 6/24/2021) 30 Tab 0       Allergies   Allergen Reactions    Demerol [Meperidine] Other (comments)     Lowers BP    Meperidine (Pf) Other (comments)     DROPS BLOOD PRESSURE    Cat Dander Itching and Sneezing          PHYSICAL EXAMINATION    Visit Vitals  /78 (BP 1 Location: Right upper arm, BP Patient Position: Sitting)   Pulse 65   Temp 97.5 °F (36.4 °C) (Temporal)   Resp 16   Ht 5' 4\" (1.626 m)   Wt 176 lb 3.2 oz (79.9 kg)   LMP 09/11/2018 (Exact Date)   SpO2 98%   BMI 30.24 kg/m²       CONSTITUTIONAL: NAD, A&O x 3  SENSATION: Intact to light touch throughout  RANGE OF MOTION: The patient has full passive range of motion in all four extremities. MOTOR:  Straight Leg Raise: Negative, bilateral               Hip Flex Knee Ext Knee Flex Ankle DF GTE Ankle PF Tone   Right +4/5 +4/5 +4/5 +4/5 +4/5 +4/5 +4/5   Left +4/5 +4/5 +4/5 +4/5 +4/5 +4/5 +4/5       ASSESSMENT   Diagnoses and all orders for this visit:    1. Lumbosacral spondylosis without myelopathy    2. Lumbar neuritis    3. DDD (degenerative disc disease), lumbar    4.  Sacroiliitis ([address]      IMPRESSION AND PLAN:  Patient returns to the office today with c/o progressive low back and buttocks pain radiating into the BLE (RLE>LLE) in a L4 distribution to the knee in the RLE and mid-thigh in the LLE . Multiple treatment options were discussed. I offered starting her on neuropathic medication, pt declined at this time. Patient wished to continue her current treatment. I recommended she increase the frequency of HEP to daily. Patient is neurologically intact. I will see the patient back prn. Written by Tamara Garcia, as dictated by Mildred Mcginnis MD  I examined the patient, reviewed and agree with the note.

## 2021-08-05 ENCOUNTER — OFFICE VISIT (OUTPATIENT)
Dept: ORTHOPEDIC SURGERY | Age: 46
End: 2021-08-05
Payer: COMMERCIAL

## 2021-08-05 VITALS
OXYGEN SATURATION: 98 % | HEART RATE: 65 BPM | DIASTOLIC BLOOD PRESSURE: 78 MMHG | HEIGHT: 64 IN | RESPIRATION RATE: 16 BRPM | BODY MASS INDEX: 30.08 KG/M2 | WEIGHT: 176.2 LBS | TEMPERATURE: 97.5 F | SYSTOLIC BLOOD PRESSURE: 122 MMHG

## 2021-08-05 DIAGNOSIS — M54.16 LUMBAR NEURITIS: ICD-10-CM

## 2021-08-05 DIAGNOSIS — M46.1 SACROILIITIS (HCC): ICD-10-CM

## 2021-08-05 DIAGNOSIS — M51.36 DDD (DEGENERATIVE DISC DISEASE), LUMBAR: ICD-10-CM

## 2021-08-05 DIAGNOSIS — M47.817 LUMBOSACRAL SPONDYLOSIS WITHOUT MYELOPATHY: Primary | ICD-10-CM

## 2021-08-05 PROCEDURE — 99213 OFFICE O/P EST LOW 20 MIN: CPT | Performed by: PHYSICAL MEDICINE & REHABILITATION

## 2021-08-05 NOTE — PROGRESS NOTES
Vasyl Deluna presents today for   Chief Complaint   Patient presents with    Back Pain       Is someone accompanying this pt? no    Is the patient using any DME equipment during OV? no    Coordination of Care:  1. Have you been to the ER, urgent care clinic since your last visit? Hospitalized since your last visit? no    2. Have you seen or consulted any other health care providers outside of the [address] [place] [address] since your last visit? Include any pap smears or colon screening.  no

## 2021-08-05 NOTE — LETTER
8/5/2021    Patient: Edson Cornelius   YOB: 1975   Date of Visit: 8/5/2021     Elmo Wilson, [address]  [facility] [address]  [address] [place] [address]  Via In Basket    Dear Elmo Wilson MD,      Thank you for referring Ms. Laisha Leavitt to [address] [facility] for evaluation. My notes for this consultation are attached. If you have questions, please do not hesitate to call me. I look forward to following your patient along with you.       Sincerely,    Lori Wolf MD

## 2022-03-18 PROBLEM — M53.3 CHRONIC LEFT SI JOINT PAIN: Status: ACTIVE | Noted: 2021-04-26

## 2022-03-18 PROBLEM — M17.0 PRIMARY OSTEOARTHRITIS OF BOTH KNEES: Status: ACTIVE | Noted: 2021-04-26

## 2022-03-18 PROBLEM — E78.5 HYPERLIPIDEMIA: Status: ACTIVE | Noted: 2021-04-26

## 2022-03-18 PROBLEM — G89.29 CHRONIC LEFT SI JOINT PAIN: Status: ACTIVE | Noted: 2021-04-26

## 2022-03-19 PROBLEM — E66.3 OVERWEIGHT (BMI 25.0-29.9): Status: ACTIVE | Noted: 2018-10-02

## 2022-05-31 NOTE — PATIENT INSTRUCTIONS
Patient called in stating she had some soreness in her chest that she believes is from a fall about two weeks ago. She just wants to make sure it is not cardiac. PSR has scheduled her to come in and see her PCP tomorrow morning. Please call back if necessary. Thyroid-Stimulating Hormone (TSH) Test: About This Test 
What is it? A thyroid-stimulating hormone (TSH) test is one of several blood tests used to check for thyroid gland problems. TSH causes the thyroid gland to make other important hormones that help control your body's metabolism. Why is this test done? This test is done to: · Find out if the thyroid gland is working as it should. · Find out if a problem with the thyroid is causing symptoms such as growth problems, tiredness, weight gain, or weight loss. · Keep track of how well thyroid treatment is working. · Test a  to find out if his or her thyroid gland is working as it should. How do you prepare for the test? 
In general, there's nothing you have to do before this test, unless your doctor tells you to. How is the test done? A health professional uses a needle to take a blood sample, usually from the arm. How long does the test take? The test will take a few minutes. What happens after the test? 
· You will probably be able to go home right away. · You can go back to your usual activities right away. Follow-up care is a key part of your treatment and safety. Be sure to make and go to all appointments, and call your doctor if you are having problems. It's also a good idea to keep a list of the medicines you take. Ask your doctor when you can expect to have your test results. Where can you learn more? Go to http://www.gray.com/ Enter H815 in the search box to learn more about \"Thyroid-Stimulating Hormone (TSH) Test: About This Test.\" Current as of: 2020               Content Version: 12.8 © 8189-6826 [email or web address]. Care instructions adapted under license by [employer] (which disclaims liability or warranty for this information).  If you have questions about a medical condition or this instruction, always ask your healthcare professional. [employer] disclaims any warranty or liability for your use of this information.

## 2023-05-12 RX ORDER — IBUPROFEN 200 MG
TABLET ORAL PRN
COMMUNITY

## 2023-05-12 RX ORDER — FEXOFENADINE HCL AND PSEUDOEPHEDRINE HCI 180; 240 MG/1; MG/1
1 TABLET, EXTENDED RELEASE ORAL DAILY
COMMUNITY

## 2023-05-12 RX ORDER — CETIRIZINE HYDROCHLORIDE 10 MG/1
TABLET ORAL DAILY PRN
COMMUNITY
Start: 2021-01-27

## 2023-05-12 RX ORDER — COVID-19 ANTIGEN TEST
KIT MISCELLANEOUS DAILY PRN
COMMUNITY

## 2023-05-12 RX ORDER — CALCIUM CARBONATE/VITAMIN D3 500 MG-10
TABLET ORAL 2 TIMES DAILY
COMMUNITY

## 2023-05-12 RX ORDER — METHYLPREDNISOLONE 4 MG/1
TABLET ORAL
COMMUNITY
Start: 2021-06-24